# Patient Record
Sex: FEMALE | Race: WHITE
[De-identification: names, ages, dates, MRNs, and addresses within clinical notes are randomized per-mention and may not be internally consistent; named-entity substitution may affect disease eponyms.]

---

## 2018-03-07 ENCOUNTER — HOSPITAL ENCOUNTER (OUTPATIENT)
Dept: HOSPITAL 96 - M.RAD | Age: 68
End: 2018-03-07
Attending: INTERNAL MEDICINE
Payer: MEDICARE

## 2018-03-07 DIAGNOSIS — Z12.31: Primary | ICD-10-CM

## 2020-10-05 ENCOUNTER — HOSPITAL ENCOUNTER (INPATIENT)
Dept: HOSPITAL 96 - M.ERS | Age: 70
LOS: 6 days | Discharge: HOME HEALTH | DRG: 871 | End: 2020-10-11
Attending: INTERNAL MEDICINE | Admitting: INTERNAL MEDICINE
Payer: MEDICARE

## 2020-10-05 VITALS — SYSTOLIC BLOOD PRESSURE: 109 MMHG | DIASTOLIC BLOOD PRESSURE: 43 MMHG

## 2020-10-05 VITALS — DIASTOLIC BLOOD PRESSURE: 39 MMHG | SYSTOLIC BLOOD PRESSURE: 75 MMHG

## 2020-10-05 VITALS — DIASTOLIC BLOOD PRESSURE: 64 MMHG | SYSTOLIC BLOOD PRESSURE: 135 MMHG

## 2020-10-05 VITALS — DIASTOLIC BLOOD PRESSURE: 58 MMHG | SYSTOLIC BLOOD PRESSURE: 131 MMHG

## 2020-10-05 VITALS — SYSTOLIC BLOOD PRESSURE: 97 MMHG | DIASTOLIC BLOOD PRESSURE: 39 MMHG

## 2020-10-05 VITALS — DIASTOLIC BLOOD PRESSURE: 33 MMHG | SYSTOLIC BLOOD PRESSURE: 73 MMHG

## 2020-10-05 VITALS — DIASTOLIC BLOOD PRESSURE: 52 MMHG | SYSTOLIC BLOOD PRESSURE: 97 MMHG

## 2020-10-05 VITALS — SYSTOLIC BLOOD PRESSURE: 97 MMHG | DIASTOLIC BLOOD PRESSURE: 42 MMHG

## 2020-10-05 VITALS — DIASTOLIC BLOOD PRESSURE: 49 MMHG | SYSTOLIC BLOOD PRESSURE: 117 MMHG

## 2020-10-05 VITALS — DIASTOLIC BLOOD PRESSURE: 46 MMHG | SYSTOLIC BLOOD PRESSURE: 113 MMHG

## 2020-10-05 VITALS — SYSTOLIC BLOOD PRESSURE: 142 MMHG | DIASTOLIC BLOOD PRESSURE: 68 MMHG

## 2020-10-05 VITALS — SYSTOLIC BLOOD PRESSURE: 111 MMHG | DIASTOLIC BLOOD PRESSURE: 50 MMHG

## 2020-10-05 VITALS — SYSTOLIC BLOOD PRESSURE: 107 MMHG | DIASTOLIC BLOOD PRESSURE: 59 MMHG

## 2020-10-05 VITALS — SYSTOLIC BLOOD PRESSURE: 126 MMHG | DIASTOLIC BLOOD PRESSURE: 52 MMHG

## 2020-10-05 VITALS — SYSTOLIC BLOOD PRESSURE: 105 MMHG | DIASTOLIC BLOOD PRESSURE: 45 MMHG

## 2020-10-05 VITALS — DIASTOLIC BLOOD PRESSURE: 58 MMHG | SYSTOLIC BLOOD PRESSURE: 121 MMHG

## 2020-10-05 VITALS — SYSTOLIC BLOOD PRESSURE: 71 MMHG | DIASTOLIC BLOOD PRESSURE: 33 MMHG

## 2020-10-05 VITALS — SYSTOLIC BLOOD PRESSURE: 96 MMHG | DIASTOLIC BLOOD PRESSURE: 54 MMHG

## 2020-10-05 VITALS — DIASTOLIC BLOOD PRESSURE: 45 MMHG | SYSTOLIC BLOOD PRESSURE: 103 MMHG

## 2020-10-05 VITALS — SYSTOLIC BLOOD PRESSURE: 106 MMHG | DIASTOLIC BLOOD PRESSURE: 52 MMHG

## 2020-10-05 VITALS — DIASTOLIC BLOOD PRESSURE: 35 MMHG | SYSTOLIC BLOOD PRESSURE: 82 MMHG

## 2020-10-05 VITALS — DIASTOLIC BLOOD PRESSURE: 44 MMHG | SYSTOLIC BLOOD PRESSURE: 98 MMHG

## 2020-10-05 VITALS — SYSTOLIC BLOOD PRESSURE: 123 MMHG | DIASTOLIC BLOOD PRESSURE: 56 MMHG

## 2020-10-05 VITALS — SYSTOLIC BLOOD PRESSURE: 107 MMHG | DIASTOLIC BLOOD PRESSURE: 54 MMHG

## 2020-10-05 VITALS — SYSTOLIC BLOOD PRESSURE: 127 MMHG | DIASTOLIC BLOOD PRESSURE: 92 MMHG

## 2020-10-05 VITALS — DIASTOLIC BLOOD PRESSURE: 48 MMHG | SYSTOLIC BLOOD PRESSURE: 113 MMHG

## 2020-10-05 VITALS — DIASTOLIC BLOOD PRESSURE: 41 MMHG | SYSTOLIC BLOOD PRESSURE: 102 MMHG

## 2020-10-05 VITALS — SYSTOLIC BLOOD PRESSURE: 140 MMHG | DIASTOLIC BLOOD PRESSURE: 41 MMHG

## 2020-10-05 VITALS — DIASTOLIC BLOOD PRESSURE: 61 MMHG | SYSTOLIC BLOOD PRESSURE: 134 MMHG

## 2020-10-05 VITALS — SYSTOLIC BLOOD PRESSURE: 101 MMHG | DIASTOLIC BLOOD PRESSURE: 50 MMHG

## 2020-10-05 VITALS — DIASTOLIC BLOOD PRESSURE: 50 MMHG | SYSTOLIC BLOOD PRESSURE: 108 MMHG

## 2020-10-05 VITALS — SYSTOLIC BLOOD PRESSURE: 99 MMHG | DIASTOLIC BLOOD PRESSURE: 48 MMHG

## 2020-10-05 VITALS — DIASTOLIC BLOOD PRESSURE: 50 MMHG | SYSTOLIC BLOOD PRESSURE: 111 MMHG

## 2020-10-05 VITALS — DIASTOLIC BLOOD PRESSURE: 55 MMHG | SYSTOLIC BLOOD PRESSURE: 134 MMHG

## 2020-10-05 VITALS — SYSTOLIC BLOOD PRESSURE: 89 MMHG | DIASTOLIC BLOOD PRESSURE: 35 MMHG

## 2020-10-05 VITALS — BODY MASS INDEX: 39.81 KG/M2 | WEIGHT: 172 LBS | HEIGHT: 55 IN

## 2020-10-05 VITALS — DIASTOLIC BLOOD PRESSURE: 56 MMHG | SYSTOLIC BLOOD PRESSURE: 116 MMHG

## 2020-10-05 VITALS — SYSTOLIC BLOOD PRESSURE: 97 MMHG | DIASTOLIC BLOOD PRESSURE: 36 MMHG

## 2020-10-05 VITALS — DIASTOLIC BLOOD PRESSURE: 49 MMHG | SYSTOLIC BLOOD PRESSURE: 120 MMHG

## 2020-10-05 VITALS — SYSTOLIC BLOOD PRESSURE: 138 MMHG | DIASTOLIC BLOOD PRESSURE: 62 MMHG

## 2020-10-05 VITALS — DIASTOLIC BLOOD PRESSURE: 53 MMHG | SYSTOLIC BLOOD PRESSURE: 123 MMHG

## 2020-10-05 VITALS — SYSTOLIC BLOOD PRESSURE: 109 MMHG | DIASTOLIC BLOOD PRESSURE: 47 MMHG

## 2020-10-05 VITALS — DIASTOLIC BLOOD PRESSURE: 36 MMHG | SYSTOLIC BLOOD PRESSURE: 76 MMHG

## 2020-10-05 VITALS — SYSTOLIC BLOOD PRESSURE: 111 MMHG | DIASTOLIC BLOOD PRESSURE: 64 MMHG

## 2020-10-05 VITALS — DIASTOLIC BLOOD PRESSURE: 43 MMHG | SYSTOLIC BLOOD PRESSURE: 100 MMHG

## 2020-10-05 VITALS — SYSTOLIC BLOOD PRESSURE: 102 MMHG | DIASTOLIC BLOOD PRESSURE: 42 MMHG

## 2020-10-05 VITALS — DIASTOLIC BLOOD PRESSURE: 40 MMHG | SYSTOLIC BLOOD PRESSURE: 95 MMHG

## 2020-10-05 VITALS — DIASTOLIC BLOOD PRESSURE: 63 MMHG | SYSTOLIC BLOOD PRESSURE: 132 MMHG

## 2020-10-05 VITALS — SYSTOLIC BLOOD PRESSURE: 108 MMHG | DIASTOLIC BLOOD PRESSURE: 54 MMHG

## 2020-10-05 VITALS — SYSTOLIC BLOOD PRESSURE: 87 MMHG | DIASTOLIC BLOOD PRESSURE: 44 MMHG

## 2020-10-05 VITALS — SYSTOLIC BLOOD PRESSURE: 118 MMHG | DIASTOLIC BLOOD PRESSURE: 48 MMHG

## 2020-10-05 VITALS — SYSTOLIC BLOOD PRESSURE: 95 MMHG | DIASTOLIC BLOOD PRESSURE: 38 MMHG

## 2020-10-05 VITALS — SYSTOLIC BLOOD PRESSURE: 106 MMHG | DIASTOLIC BLOOD PRESSURE: 49 MMHG

## 2020-10-05 VITALS — DIASTOLIC BLOOD PRESSURE: 43 MMHG | SYSTOLIC BLOOD PRESSURE: 98 MMHG

## 2020-10-05 VITALS — DIASTOLIC BLOOD PRESSURE: 29 MMHG | SYSTOLIC BLOOD PRESSURE: 72 MMHG

## 2020-10-05 VITALS — SYSTOLIC BLOOD PRESSURE: 86 MMHG | DIASTOLIC BLOOD PRESSURE: 39 MMHG

## 2020-10-05 VITALS — DIASTOLIC BLOOD PRESSURE: 47 MMHG | SYSTOLIC BLOOD PRESSURE: 117 MMHG

## 2020-10-05 VITALS — DIASTOLIC BLOOD PRESSURE: 49 MMHG | SYSTOLIC BLOOD PRESSURE: 123 MMHG

## 2020-10-05 VITALS — DIASTOLIC BLOOD PRESSURE: 39 MMHG | SYSTOLIC BLOOD PRESSURE: 92 MMHG

## 2020-10-05 VITALS — SYSTOLIC BLOOD PRESSURE: 116 MMHG | DIASTOLIC BLOOD PRESSURE: 54 MMHG

## 2020-10-05 VITALS — DIASTOLIC BLOOD PRESSURE: 62 MMHG | SYSTOLIC BLOOD PRESSURE: 112 MMHG

## 2020-10-05 VITALS — SYSTOLIC BLOOD PRESSURE: 104 MMHG | DIASTOLIC BLOOD PRESSURE: 47 MMHG

## 2020-10-05 VITALS — SYSTOLIC BLOOD PRESSURE: 96 MMHG | DIASTOLIC BLOOD PRESSURE: 57 MMHG

## 2020-10-05 VITALS — SYSTOLIC BLOOD PRESSURE: 86 MMHG | DIASTOLIC BLOOD PRESSURE: 36 MMHG

## 2020-10-05 VITALS — SYSTOLIC BLOOD PRESSURE: 125 MMHG | DIASTOLIC BLOOD PRESSURE: 67 MMHG

## 2020-10-05 VITALS — DIASTOLIC BLOOD PRESSURE: 54 MMHG | SYSTOLIC BLOOD PRESSURE: 107 MMHG

## 2020-10-05 DIAGNOSIS — I95.9: ICD-10-CM

## 2020-10-05 DIAGNOSIS — D69.6: ICD-10-CM

## 2020-10-05 DIAGNOSIS — Z28.21: ICD-10-CM

## 2020-10-05 DIAGNOSIS — E44.0: ICD-10-CM

## 2020-10-05 DIAGNOSIS — Z79.82: ICD-10-CM

## 2020-10-05 DIAGNOSIS — Z96.641: ICD-10-CM

## 2020-10-05 DIAGNOSIS — K62.5: ICD-10-CM

## 2020-10-05 DIAGNOSIS — F41.1: ICD-10-CM

## 2020-10-05 DIAGNOSIS — K25.4: ICD-10-CM

## 2020-10-05 DIAGNOSIS — E66.9: ICD-10-CM

## 2020-10-05 DIAGNOSIS — Z90.710: ICD-10-CM

## 2020-10-05 DIAGNOSIS — F32.9: ICD-10-CM

## 2020-10-05 DIAGNOSIS — F44.4: ICD-10-CM

## 2020-10-05 DIAGNOSIS — E87.5: ICD-10-CM

## 2020-10-05 DIAGNOSIS — E87.1: ICD-10-CM

## 2020-10-05 DIAGNOSIS — D50.9: ICD-10-CM

## 2020-10-05 DIAGNOSIS — Z79.4: ICD-10-CM

## 2020-10-05 DIAGNOSIS — K31.89: ICD-10-CM

## 2020-10-05 DIAGNOSIS — N18.9: ICD-10-CM

## 2020-10-05 DIAGNOSIS — K64.8: ICD-10-CM

## 2020-10-05 DIAGNOSIS — E11.40: ICD-10-CM

## 2020-10-05 DIAGNOSIS — Z20.828: ICD-10-CM

## 2020-10-05 DIAGNOSIS — K64.4: ICD-10-CM

## 2020-10-05 DIAGNOSIS — K21.00: ICD-10-CM

## 2020-10-05 DIAGNOSIS — I12.9: ICD-10-CM

## 2020-10-05 DIAGNOSIS — A41.9: Primary | ICD-10-CM

## 2020-10-05 DIAGNOSIS — N17.0: ICD-10-CM

## 2020-10-05 DIAGNOSIS — R68.81: ICD-10-CM

## 2020-10-05 DIAGNOSIS — I25.10: ICD-10-CM

## 2020-10-05 DIAGNOSIS — K22.2: ICD-10-CM

## 2020-10-05 DIAGNOSIS — R65.21: ICD-10-CM

## 2020-10-05 DIAGNOSIS — Z95.1: ICD-10-CM

## 2020-10-05 DIAGNOSIS — E11.22: ICD-10-CM

## 2020-10-05 DIAGNOSIS — G89.29: ICD-10-CM

## 2020-10-05 DIAGNOSIS — K72.00: ICD-10-CM

## 2020-10-05 DIAGNOSIS — Z79.899: ICD-10-CM

## 2020-10-05 LAB
ABSOLUTE BASOPHILS: 0.1 THOU/UL (ref 0–0.2)
ABSOLUTE EOSINOPHILS: 0.2 THOU/UL (ref 0–0.7)
ABSOLUTE MONOCYTES: 0.8 THOU/UL (ref 0–1.2)
ALBUMIN SERPL-MCNC: 3.6 G/DL (ref 3.4–5)
ALP SERPL-CCNC: 58 U/L (ref 46–116)
ALT SERPL-CCNC: 52 U/L (ref 30–65)
ANION GAP SERPL CALC-SCNC: 10 MMOL/L (ref 7–16)
ANION GAP SERPL CALC-SCNC: 11 MMOL/L (ref 7–16)
APTT BLD: 26.6 SECONDS (ref 25–31.3)
AST SERPL-CCNC: 46 U/L (ref 15–37)
BASOPHILS NFR BLD AUTO: 0.9 %
BE: -6.5 MMOL/L
BILIRUB SERPL-MCNC: 0.3 MG/DL
BILIRUB UR-MCNC: NEGATIVE MG/DL
BUN SERPL-MCNC: 79 MG/DL (ref 7–18)
BUN SERPL-MCNC: 93 MG/DL (ref 7–18)
CALCIUM SERPL-MCNC: 8 MG/DL (ref 8.5–10.1)
CALCIUM SERPL-MCNC: 8.7 MG/DL (ref 8.5–10.1)
CHLORIDE SERPL-SCNC: 103 MMOL/L (ref 98–107)
CHLORIDE SERPL-SCNC: 96 MMOL/L (ref 98–107)
CK-MB MASS: 4.8 NG/ML
CO2 SERPL-SCNC: 23 MMOL/L (ref 21–32)
CO2 SERPL-SCNC: 23 MMOL/L (ref 21–32)
COLOR UR: YELLOW
CREAT SERPL-MCNC: 4.4 MG/DL (ref 0.6–1.3)
CREAT SERPL-MCNC: 5.5 MG/DL (ref 0.6–1.3)
EOSINOPHIL NFR BLD: 3.2 %
GAS PNL BLDV: 64.6 MMHG (ref 35–45)
GLUCOSE SERPL-MCNC: 154 MG/DL (ref 70–99)
GLUCOSE SERPL-MCNC: 155 MG/DL (ref 70–99)
GRANULOCYTES NFR BLD MANUAL: 70.4 %
HCT VFR BLD CALC: 32.6 % (ref 37–47)
HGB BLD-MCNC: 10.8 GM/DL (ref 12–15)
INR PPP: 1.2
KETONES UR STRIP-MCNC: NEGATIVE MG/DL
LYMPHOCYTES # BLD: 1 THOU/UL (ref 0.8–5.3)
LYMPHOCYTES NFR BLD AUTO: 13.8 %
MAGNESIUM SERPL-MCNC: 2.7 MG/DL (ref 1.8–2.4)
MCH RBC QN AUTO: 31.7 PG (ref 26–34)
MCHC RBC AUTO-ENTMCNC: 33.1 G/DL (ref 28–37)
MCV RBC: 95.6 FL (ref 80–100)
MONOCYTES NFR BLD: 11.7 %
MPV: 9.6 FL. (ref 7.2–11.1)
NEUTROPHILS # BLD: 4.9 THOU/UL (ref 1.6–8.1)
NT-PRO BRAIN NAT PEPTIDE: 555 PG/ML (ref ?–300)
NUCLEATED RBCS: 0 /100WBC
PCO2 BLDV: 44.6 MMHG (ref 41–51)
PHOSPHATE SERPL-MCNC: 5.9 MG/DL (ref 2.5–4.9)
PLATELET COUNT*: 157 THOU/UL (ref 150–400)
POTASSIUM SERPL-SCNC: 4.8 MMOL/L (ref 3.5–5.1)
POTASSIUM SERPL-SCNC: 5.8 MMOL/L (ref 3.5–5.1)
PROT SERPL-MCNC: 6.9 G/DL (ref 6.4–8.2)
PROT UR QL STRIP: NEGATIVE
PROTHROMBIN TIME: 12.1 SECONDS (ref 9.2–11.5)
RBC # BLD AUTO: 3.41 MIL/UL (ref 4.2–5)
RBC # UR STRIP: NEGATIVE /UL
RDW-CV: 16.5 % (ref 10.5–14.5)
SODIUM SERPL-SCNC: 130 MMOL/L (ref 136–145)
SODIUM SERPL-SCNC: 136 MMOL/L (ref 136–145)
SP GR UR STRIP: 1.01 (ref 1–1.03)
URINE CLARITY: CLEAR
URINE GLUCOSE-RANDOM: NEGATIVE
URINE LEUKOCYTES-REFLEX: NEGATIVE
URINE NITRITE-REFLEX: NEGATIVE
UROBILINOGEN UR STRIP-ACNC: 0.2 E.U./DL (ref 0.2–1)
WBC # BLD AUTO: 6.9 THOU/UL (ref 4–11)

## 2020-10-05 NOTE — CON
[facility] 
[address] [place]  [address]                    CONSULTATION                  
_______________________________________________________________________________
 
Name:       ALPERS,BERNICE KAY             Room:           [address]    ADM IN  
M.R.#:  [id number]      Account #:      [id number]  
Admission:  10/05/20     Attend Phys:    JOSÉ MIGUEL Pastrana
Discharge:               Date of Birth:  [birth year]  
         Report #: [id number]
                                                                     [id number]  
_______________________________________________________________________________
THIS REPORT FOR:  //name//                      
 
cc:  Pernell Lemos MD, Richard T. MD                                                  ~
THIS REPORT FOR:  //name//                      
 
CC: Pernell Trammell
 
DATE OF SERVICE:  10/05/2020
 
 
HISTORY OF PRESENT ILLNESS:  The patient is a 69-year-old single white female
who I was asked to see in the hospital today because of her history of coronary
artery disease.  The patient apparently had coronary artery bypass surgery at
[facility] years ago.  She has been in assisted living for the
past 6 years.  She has a history of chronic kidney disease and follows with a
nephrologist at .  She is not very active and uses a walker.  She was recently
having problems with her blood sugars.  She was admitted to [facility] last week for 5 days.  She was discharged.  She was back to
rehab for 5 days.  However, she was brought to the Emergency Room here at [facility] last night.  She complained of fatigue.  She had to lower herself to the
floor.  She did not pass out.  She was brought here by ambulance.  She denied
any fever, cough or vomiting.  She did have some loose stools, no bleeding. 
Denied chest pain, shortness of breath or palpitations.
 
PAST MEDICAL HISTORY:  She has had hip surgery, bilateral knee surgery.  She had
gastric bypass for obesity.  She has a long history of diabetes.
 
MEDICATIONS:  At rehab center includes aspirin, Janumet, Remeron, omeprazole,
Pristiq, Seroquel, Ranexa, propranolol, tramadol, insulin, simvastatin,
clonazepam, gemfibrozil.
 
ALLERGIES:  She has no known drug allergies.
 
FAMILY HISTORY:  Her mother and father had heart disease.
 
SOCIAL HISTORY:  She is .  No smoking or alcohol abuse.
 
REVIEW OF SYSTEMS:  She has no history of stroke, asthma or liver disease.  She
has chronic kidney disease.  No cancer.  She has a history of bipolar disorder. 
She is followed by Psychiatry.
 
PHYSICAL EXAMINATION:
GENERAL:  Reveals an elderly female lying in bed.  She appeared in no distress.
VITAL SIGNS:  She had a blood pressure of 110/60, pulse 60.  She was afebrile.
 
 
 
[place] [address]                    CONSULTATION                  
_______________________________________________________________________________
 
Name:       ALPERS,BERNICE KAY             Room:           [address]#:  [id number]      Account #:      [id number]  
Admission:  10/05/20     Attend Phys:    JOSÉ MIGUEL Pastrana
Discharge:               Date of Birth:  [birth year]  
         Report #: [id number]
                                                                     [id number]  
_______________________________________________________________________________
HEENT:  She was anicteric.  Conjunctivae are pink.  Mucous members appear dry.
NECK:  Veins do not appear distended.  No carotid bruits.
CHEST:  Clear to auscultation.
CARDIOVASCULAR:  Regular rate and rhythm.
ABDOMEN:  Obese.
EXTREMITIES:  Had no pitting edema.  Dorsalis pedis pulse 2+ bilaterally.
SKIN:  Cool and dry.
NEUROLOGIC:  Nonfocal.
 
DIAGNOSTIC DATA:  Her ECG last night showed a sinus rhythm with a left bundle
branch block.  The patient had an echocardiogram done here at [place] in 2018
that showed an ejection fraction of 60%, aortic sclerosis, mild mitral
regurgitation.  Her nuclear stress test in 2018 showed no evidence of ischemia
with an ejection fraction of 64%.  Her workup in the Emergency Room last night;
she had a portable chest x-ray that showed normal heart size, clear lung fields.
 
LABORATORY DATA:  Her lab work last night; sodium 136, BUN 79, creatinine 4.4. 
Her liver function studies were normal.  Troponin 0.06.  .  Her white
blood cell count 6.9, hematocrit 32.6.
 
IMPRESSION AND RECOMMENDATIONS:
1.  Coronary artery disease.  Previous bypass surgery.  I would continue an
aspirin a day.
2.  Diabetes.
3.  Chronic kidney disease.
4.  Bipolar disorder.
5.  Hypotension.
 
The patient does not appear dehydrated at this time.  I would recommend cutting
back on her psychoactive drugs.  I would hold Lasix at this time.  I would also
hold lisinopril, which the patient is taking.
 
 
 
 
 
 
 
 
 
 
 
 
 
<ELECTRONICALLY SIGNED>
                                        By:  Billy Connelly MD, FACC      
10/05/20     1553
D: 10/05/20 1435_______________________________________
T: 10/05/20 1519Billy Connelly MD, FACC         /nt

## 2020-10-05 NOTE — 2DMMODE
[place] [address]
Phone:  [phone] 2 D/M-MODE ECHOCARDIOGRAM     
_______________________________________________________________________________
 
Name:         ALPERS,BERNICE KAY            Room:          [address]    ADM IN 
M.R.#:    [id number]     Account #:     [id number]  
Admission:    10/05/20    Attend Phys:   González Trammell
Discharge:                Date of Birth: [birth year]  
Date of Service: 10/05/20 1519  Report #:      [id number]
        [id number]
_______________________________________________________________________________
THIS REPORT FOR:
 
cc:  Pernell Lemos MD, Richard T. MD Blick, David R. MD [facility]        
                                                                       ~
 
--------------- APPROVED REPORT --------------
 
 
Study performed:  10/05/2020 13:08:54
 
EXAM: Comprehensive 2D, Doppler, and color-flow 
Echocardiogram 
Patient Location: Bedside   
 
      BSA:         1.67
HR: 66 bpm BP:          106/49 mmHg 
 
Other Information 
Study Quality: Adequate
 
Indications
Abnormal ECG 
Hypotension
 
2D Dimensions
IVSd:  9.13 (7-11mm) LVOT Diam:  18.11 (18-24mm) 
LVDd:  35.42 mm  
PWd:  8.36 (7-11mm) Ascending Ao:  26.94 (22-36mm)
LVDs:  22.44 (25-40mm) 
Aortic Root:  23.28 mm 
 
Volumes
Left Atrial Volume (Systole) 
    LA ESV Index:  23.40 mL/m2
 
Aortic Valve
AoV Peak Archie.:  1.21 m/s 
AO Peak Gr.:  5.87 mmHg  LVOT Max PG:  3.54 mmHg
AO Mean Gr.:  2.97 mmHg  LVOT Mean P.89 mmHg
    LVOT Max V:  0.94 m/s
AO V2 VTI:  19.16 cm  LVOT Mean V:  0.64 m/s
AIDE (VTI):  2.13 cm2  LVOT V1 VTI:  15.85 cm
 
Mitral Valve
 
 
[place] [address]
Phone:  [phone]                     2 D/M-MODE ECHOCARDIOGRAM     
_______________________________________________________________________________
 
Name:         ALPERS,BERNICE KAY            Room:          [address] [place] 
M.R.#:    [id number]     Account #:     [id number]  
Admission:    10/05/20    Attend Phys:   González Trammell
Discharge:                Date of Birth: [birth year]  
Date of Service: 10/05/20 1519  Report #:      [id number]
        [id number]
_______________________________________________________________________________
    E/A Ratio:  0.88
    MV Decel. Time:  188.55 ms
MV E Max Archie.:  0.66 m/s 
MV PHT:  54.68 ms  
MVA (PHT):  4.02 cm2  
 
TDI
E/Lateral E':  7.33 E/Medial E':  11.00
   Medial E' Archie.:  0.06 m/s
   Lateral E' Archie.:  0.09 m/s
 
Pulmonary Valve
PV Peak Archie.:  1.38 m/s PV Peak Gr.:  7.63 mmHg
 
Tricuspid Valve
    RAP Estimate:  20.00 mmHg
TR Peak Gr.:  19.19 mmHg RVSP:  39.19 mmHg
    PA Pressure:  39.19 mmHg
 
Left Ventricle
The left ventricle is normal size. There is normal left ventricular 
wall thickness. The left ventricular systolic function is normal. The 
left ventricular ejection fraction is within the normal range. LVEF 
is 50-55%. Grade I - abnormal relaxation pattern.
 
Right Ventricle
The right ventricle is normal size. The right ventricular systolic 
function is normal.
 
Atria
The left atrium size is normal. The right atrium size is 
normal.
 
Aortic Valve
The aortic valve is normal in structure. No aortic regurgitation is 
present. There is no aortic valvular stenosis.
 
Mitral Valve
The mitral valve is normal in structure. Mild mitral regurgitation. 
No evidence of mitral valve stenosis.
 
Tricuspid Valve
The tricuspid valve is normal in structure. Trace tricuspid 
regurgitation.
 
Pulmonic Valve
 
 
[place] [address]
Phone:  [phone]                     2 D/M-MODE ECHOCARDIOGRAM     
_______________________________________________________________________________
 
Name:         ALPERS,BERNICE KAY            Room:          [address]#:    [id number]     Account #:     [id number]  
Admission:    10/05/20    Attend Phys:   González Trammell
Discharge:                Date of Birth: [birth year]  
Date of Service: 10/05/20 1519  Report #:      [id number]
        [id number]
_______________________________________________________________________________
Pulmonic valve is not well visualized. There is no pulmonic valvular 
regurgitation.
 
Great Vessels
The aortic root is normal in size. IVC is dilated.
 
Pericardium
There is no pericardial effusion.
 
<Conclusion>
The left ventricular systolic function is normal.
The left ventricular ejection fraction is within the normal 
range.
Mild mitral regurgitation.
 
 
 
 
 
 
 
 
 
 
 
 
 
 
 
 
 
 
 
 
 
 
 
 
 
 
 
 
 
 
  <ELECTRONICALLY SIGNED>
                                           By: Billy Connelly MD, FACC      
  10/05/20     1519
D: 10/05/20 1519   _____________________________________
T: 10/05/20 1519   Billy Connelly MD, FACC        /INF

## 2020-10-05 NOTE — PROC
[address]  [address]                    PROCEDURE REPORT              
_______________________________________________________________________________
 
Name:       ALPERS,BERNICE KAY             Room:           [address] [place]  
M.R.#:  [id number]      Account #:      [id number]  
Admission:  10/05/20     Attend Phys:    JOSÉ MIGUEL Pastrana
Discharge:  10/11/20     Date of Birth:  [birth year]  
         Report #: [id number]
                                                                                
_______________________________________________________________________________
THIS REPORT FOR:  //name//                      
 
cc:  Pernell Lemos MD, Richard T. MD                                                  ~
THIS REPORT FOR:  //name//                      
 
For GI report, please see the Provation report in Perceptive 7 content.
 
 
 
 
 
 
 
 
 
 
 
 
 
 
 
 
 
 
 
 
 
 
 
 
 
 
 
 
 
 
 
 
 
 
 
 
 
                       
                                        By:                                
                 
D: 10/10/20     _______________________________________
T: 10/14/20 [facility]4Medical Records Staff GRACIELA       /AL

## 2020-10-05 NOTE — CON
[address]  [address]                    CONSULTATION                  
_______________________________________________________________________________
 
Name:       ALPERS,BERNICE KAY             Room:           [address]    ADM IN  
M.R.#:  [id number]      Account #:      [id number]  
Admission:  10/05/20     Attend Phys:    JOSÉ MIGUEL Pastrana
Discharge:               Date of Birth:  [birth year]  
         Report #: [id number]
                                                                     [id number]  
_______________________________________________________________________________
THIS REPORT FOR:  //name//                      
 
cc:  Pernell Lemos MD, Richard T. MD                                                  ~
THIS REPORT FOR:  //name//                      
 
CC: Pernell Trammell
 
DICTATED BY: Ingrid Zaidi [facility]
 
DATE OF SERVICE:  10/09/2020
 
 
PCP:  Dr. Pernell Lemos.
 
Please note at the time of this dictation, the patient was seen and physically
examined by myself.
 
REASON FOR CONSULTATION:  Anemia.
 
HISTORY OF PRESENT ILLNESS:  This is a 69-year-old female who resides at
[facility] in which she came in because of generalized weakness.
 She normally walks with a walker, but felt too weak to walk.  Apparently, she
had been at [facility] last week for 5 days for UTI.  The patient states she has
been noticing that she has been having black stools for a little while, she does
take NSAIDs and between her pain pills to help with her generalized discomfort
that she has.  She does not do it always on a regular daily basis, but does do
it frequently, she states.  She has had some issues with nausea and vomiting. 
She denies any bright red blood or coffee ground emesis and she does get full
very quickly, she states.  The patient states she had an EGD and colonoscopy
done sometime in the past.  She does not recall where.  She has not seen us
previously.  It was noted that her hemoglobin back in 2016 was 13.1.  On
admission, hemoglobin was 10.8, she has dropped down to 7.9.  This morning, she
is 8.5.
 
ALLERGIES:  Seasonal allergies.
 
MEDICATIONS:  From home include Ceftin, doxepin, furosemide, insulin,
lisinopril, Zaroxolyn, Lopressor, Singulair, K-Dur, Pristiq, Ranexa, Annie
aspirin, iron sulfate, Remeron, Seroquel, Nitrostat, Compazine, Anusol
suppositories, nystatin, Topamax, vitamin B12, Colace, Mag-Ox, Zyloprim,
NovoLog, clonazepam, Lipitor, vitamin D, TriCor, Lorcet, loperamide, and
naproxen.
 
 
 
 
[place] [address]                    CONSULTATION                  
_______________________________________________________________________________
 
Name:       ALPERS,BERNICE KAY             Room:           [address] [place]  
[facility]#:  [id number]      Account #:      [id number]  
Admission:  10/05/20     Attend Phys:    JOSÉ MIGUEL Pastrana
Discharge:               Date of Birth:  [birth year]  
         Report #: [id number]
                                                                     [id number]  
_______________________________________________________________________________
PAST MEDICAL HISTORY:  Diabetes, coronary artery disease, arthritis.
 
PAST SURGICAL HISTORY:  Left arm surgery, bilateral knee surgery, gastric
bypass, hysterectomy, right hip replacement, left elbow replacement and a CABG.
 
FAMILY HISTORY:  Noncontributory.
 
SOCIAL HISTORY:  Denies any alcohol, tobacco or illegal drug use.
 
REVIEW OF SYSTEMS:  Twelve-point review of systems is essentially negative
except what is mentioned in the HPI.
 
PHYSICAL EXAMINATION:
VITAL SIGNS:  Temperature 37.2, pulse 73, respirations 18, blood pressure
143/53.
HEART:  Regular rate and rhythm.
LUNGS:  Diminished, but clear.
ABDOMEN:  Soft, positive bowel sounds in all 4 quadrants with no masses or
tenderness noted.
 
LABORATORY DATA:  Hemoglobin this morning 8.5, white count is 3.9, platelets are
125; BUN is 33, on admission, she was 93.  GFR is 32.  Iron was 31,  percentage
sat 14.  Ferritin 268.  LFTs are completely normal.  CT of the abdomen and
pelvis on admission showed surgical changes in the stomach.  No free air. 
Otherwise, essentially normal.
 
IMPRESSION:
1.  Acute anemia.
2.  Melanotic stool.
3.  Nausea and vomiting.
4.  Early satiety.
5.  Thrombocytopenia.
6.  Nonsteroidal anti-inflammatory drug use.
 
PLAN:
1.  EGD:  Tomorrow with Dr. Mclean.
2.  Further recommendations to be made after the procedure has been performed.
 
Thank you for allowing us to participate in this patient's care.  Please do not
hesitate to call with any questions in regard to this consult.
 
 
 
 
                       
                                        By:                                
                 
D: 10/09/20 0914_______________________________________
T: 10/09/20 0944Sloan Mclean MD               /nt

## 2020-10-05 NOTE — EKG
[place] [address]
Phone:  [phone]                     ELECTROCARDIOGRAM REPORT      
_______________________________________________________________________________
 
Name:         ALPERS,BERNICE KAY            Room:          [address]    ADM [place] 
M.R.#:    [id number]     Account #:     [id number]  
Admission:    10/05/20    Attend Phys:   González Trammell
Discharge:                Date of Birth: [birth year]  
Date of Service: 10/05/20 0023  Report #:      [id number]
        [id number]
_______________________________________________________________________________
THIS REPORT FOR:  //name//                      
 
                         [facility] ED
                                       
Test Date:    2020-10-05               Test Time:    00:23:10
Pat Name:     BERNICE ALPERS           Department:   
Patient ID:   [id number]            Room:         [facility]
Gender:       F                        Technician:   FL
:          1950               Requested By: Pipe Thomas
Order Number: [id number] MD:   Billy Connelly
                                 Measurements
Intervals                              Axis          
Rate:         61                       P:            45
SC:           248                      QRS:          -45
QRSD:         205                      T:            116
QT:           553                                    
QTc:          557                                    
                           Interpretive Statements
Sinus rhythm
Prolonged SC interval
Probable left atrial enlargement
Left bundle branch block
Compared to ECG 2016 16:30:24
First degree AV block now present
Left bundle-branch block now present
Electronically Signed On 10-5-2020 9:40:25 CDT by Billy Connelly
[email or web address]
 
 
 
 
 
 
 
 
 
 
 
 
 
 
 
 
 
  <ELECTRONICALLY SIGNED>
                                           By: Billy Connelly MD, FACC      
  10/05/20     0940
D: 10/05/20 0023   _____________________________________
T: 10/05/20 0023   Billy Connelly MD, FACC        /EPI

## 2020-10-06 VITALS — SYSTOLIC BLOOD PRESSURE: 90 MMHG | DIASTOLIC BLOOD PRESSURE: 47 MMHG

## 2020-10-06 VITALS — SYSTOLIC BLOOD PRESSURE: 113 MMHG | DIASTOLIC BLOOD PRESSURE: 93 MMHG

## 2020-10-06 VITALS — SYSTOLIC BLOOD PRESSURE: 81 MMHG | DIASTOLIC BLOOD PRESSURE: 33 MMHG

## 2020-10-06 VITALS — SYSTOLIC BLOOD PRESSURE: 107 MMHG | DIASTOLIC BLOOD PRESSURE: 40 MMHG

## 2020-10-06 VITALS — SYSTOLIC BLOOD PRESSURE: 141 MMHG | DIASTOLIC BLOOD PRESSURE: 58 MMHG

## 2020-10-06 VITALS — SYSTOLIC BLOOD PRESSURE: 133 MMHG | DIASTOLIC BLOOD PRESSURE: 61 MMHG

## 2020-10-06 VITALS — SYSTOLIC BLOOD PRESSURE: 127 MMHG | DIASTOLIC BLOOD PRESSURE: 52 MMHG

## 2020-10-06 VITALS — DIASTOLIC BLOOD PRESSURE: 41 MMHG | SYSTOLIC BLOOD PRESSURE: 111 MMHG

## 2020-10-06 VITALS — SYSTOLIC BLOOD PRESSURE: 125 MMHG | DIASTOLIC BLOOD PRESSURE: 54 MMHG

## 2020-10-06 VITALS — SYSTOLIC BLOOD PRESSURE: 106 MMHG | DIASTOLIC BLOOD PRESSURE: 53 MMHG

## 2020-10-06 VITALS — SYSTOLIC BLOOD PRESSURE: 102 MMHG | DIASTOLIC BLOOD PRESSURE: 52 MMHG

## 2020-10-06 VITALS — DIASTOLIC BLOOD PRESSURE: 36 MMHG | SYSTOLIC BLOOD PRESSURE: 100 MMHG

## 2020-10-06 VITALS — SYSTOLIC BLOOD PRESSURE: 114 MMHG | DIASTOLIC BLOOD PRESSURE: 53 MMHG

## 2020-10-06 VITALS — SYSTOLIC BLOOD PRESSURE: 113 MMHG | DIASTOLIC BLOOD PRESSURE: 58 MMHG

## 2020-10-06 VITALS — DIASTOLIC BLOOD PRESSURE: 85 MMHG | SYSTOLIC BLOOD PRESSURE: 109 MMHG

## 2020-10-06 VITALS — DIASTOLIC BLOOD PRESSURE: 53 MMHG | SYSTOLIC BLOOD PRESSURE: 118 MMHG

## 2020-10-06 VITALS — DIASTOLIC BLOOD PRESSURE: 32 MMHG | SYSTOLIC BLOOD PRESSURE: 95 MMHG

## 2020-10-06 VITALS — SYSTOLIC BLOOD PRESSURE: 98 MMHG | DIASTOLIC BLOOD PRESSURE: 51 MMHG

## 2020-10-06 VITALS — DIASTOLIC BLOOD PRESSURE: 35 MMHG | SYSTOLIC BLOOD PRESSURE: 94 MMHG

## 2020-10-06 VITALS — DIASTOLIC BLOOD PRESSURE: 72 MMHG | SYSTOLIC BLOOD PRESSURE: 145 MMHG

## 2020-10-06 VITALS — DIASTOLIC BLOOD PRESSURE: 45 MMHG | SYSTOLIC BLOOD PRESSURE: 111 MMHG

## 2020-10-06 VITALS — SYSTOLIC BLOOD PRESSURE: 155 MMHG | DIASTOLIC BLOOD PRESSURE: 68 MMHG

## 2020-10-06 VITALS — SYSTOLIC BLOOD PRESSURE: 120 MMHG | DIASTOLIC BLOOD PRESSURE: 61 MMHG

## 2020-10-06 VITALS — DIASTOLIC BLOOD PRESSURE: 49 MMHG | SYSTOLIC BLOOD PRESSURE: 115 MMHG

## 2020-10-06 VITALS — SYSTOLIC BLOOD PRESSURE: 80 MMHG | DIASTOLIC BLOOD PRESSURE: 31 MMHG

## 2020-10-06 VITALS — SYSTOLIC BLOOD PRESSURE: 136 MMHG | DIASTOLIC BLOOD PRESSURE: 47 MMHG

## 2020-10-06 VITALS — SYSTOLIC BLOOD PRESSURE: 117 MMHG | DIASTOLIC BLOOD PRESSURE: 48 MMHG

## 2020-10-06 VITALS — DIASTOLIC BLOOD PRESSURE: 57 MMHG | SYSTOLIC BLOOD PRESSURE: 104 MMHG

## 2020-10-06 VITALS — SYSTOLIC BLOOD PRESSURE: 88 MMHG | DIASTOLIC BLOOD PRESSURE: 26 MMHG

## 2020-10-06 VITALS — DIASTOLIC BLOOD PRESSURE: 45 MMHG | SYSTOLIC BLOOD PRESSURE: 86 MMHG

## 2020-10-06 VITALS — DIASTOLIC BLOOD PRESSURE: 49 MMHG | SYSTOLIC BLOOD PRESSURE: 109 MMHG

## 2020-10-06 VITALS — SYSTOLIC BLOOD PRESSURE: 102 MMHG | DIASTOLIC BLOOD PRESSURE: 51 MMHG

## 2020-10-06 VITALS — DIASTOLIC BLOOD PRESSURE: 43 MMHG | SYSTOLIC BLOOD PRESSURE: 105 MMHG

## 2020-10-06 VITALS — SYSTOLIC BLOOD PRESSURE: 106 MMHG | DIASTOLIC BLOOD PRESSURE: 48 MMHG

## 2020-10-06 LAB
ALBUMIN SERPL-MCNC: 2.9 G/DL (ref 3.4–5)
ALP SERPL-CCNC: 39 U/L (ref 46–116)
ALT SERPL-CCNC: 36 U/L (ref 30–65)
ANION GAP SERPL CALC-SCNC: 10 MMOL/L (ref 7–16)
AST SERPL-CCNC: 29 U/L (ref 15–37)
BILIRUB SERPL-MCNC: 0.3 MG/DL
BUN SERPL-MCNC: 59 MG/DL (ref 7–18)
CALCIUM SERPL-MCNC: 8.3 MG/DL (ref 8.5–10.1)
CHLORIDE SERPL-SCNC: 107 MMOL/L (ref 98–107)
CO2 SERPL-SCNC: 22 MMOL/L (ref 21–32)
CREAT SERPL-MCNC: 2.9 MG/DL (ref 0.6–1.3)
GLUCOSE SERPL-MCNC: 121 MG/DL (ref 70–99)
HCT VFR BLD CALC: 25.8 % (ref 37–47)
HGB BLD-MCNC: 8.6 GM/DL (ref 12–15)
MAGNESIUM SERPL-MCNC: 2.5 MG/DL (ref 1.8–2.4)
MCH RBC QN AUTO: 31.7 PG (ref 26–34)
MCHC RBC AUTO-ENTMCNC: 33.1 G/DL (ref 28–37)
MCV RBC: 95.8 FL (ref 80–100)
MPV: 10.1 FL. (ref 7.2–11.1)
PLATELET COUNT*: 115 THOU/UL (ref 150–400)
POTASSIUM SERPL-SCNC: 4 MMOL/L (ref 3.5–5.1)
PROT SERPL-MCNC: 5.8 G/DL (ref 6.4–8.2)
RBC # BLD AUTO: 2.7 MIL/UL (ref 4.2–5)
RDW-CV: 16.5 % (ref 10.5–14.5)
SODIUM SERPL-SCNC: 139 MMOL/L (ref 136–145)
WBC # BLD AUTO: 3.8 THOU/UL (ref 4–11)

## 2020-10-07 VITALS — DIASTOLIC BLOOD PRESSURE: 50 MMHG | SYSTOLIC BLOOD PRESSURE: 111 MMHG

## 2020-10-07 VITALS — DIASTOLIC BLOOD PRESSURE: 97 MMHG | SYSTOLIC BLOOD PRESSURE: 127 MMHG

## 2020-10-07 VITALS — SYSTOLIC BLOOD PRESSURE: 98 MMHG | DIASTOLIC BLOOD PRESSURE: 56 MMHG

## 2020-10-07 VITALS — DIASTOLIC BLOOD PRESSURE: 65 MMHG | SYSTOLIC BLOOD PRESSURE: 135 MMHG

## 2020-10-07 VITALS — DIASTOLIC BLOOD PRESSURE: 50 MMHG | SYSTOLIC BLOOD PRESSURE: 101 MMHG

## 2020-10-07 VITALS — SYSTOLIC BLOOD PRESSURE: 112 MMHG | DIASTOLIC BLOOD PRESSURE: 57 MMHG

## 2020-10-07 LAB
ABSOLUTE BASOPHILS: 0 THOU/UL (ref 0–0.2)
ABSOLUTE EOSINOPHILS: 0.2 THOU/UL (ref 0–0.7)
ABSOLUTE MONOCYTES: 0.6 THOU/UL (ref 0–1.2)
ANION GAP SERPL CALC-SCNC: 10 MMOL/L (ref 7–16)
BASOPHILS NFR BLD AUTO: 1.1 %
BUN SERPL-MCNC: 43 MG/DL (ref 7–18)
CALCIUM SERPL-MCNC: 8.3 MG/DL (ref 8.5–10.1)
CHLORIDE SERPL-SCNC: 108 MMOL/L (ref 98–107)
CHOLEST SERPL-MCNC: 120 MG/DL (ref ?–200)
CO2 SERPL-SCNC: 23 MMOL/L (ref 21–32)
CREAT SERPL-MCNC: 2.1 MG/DL (ref 0.6–1.3)
EOSINOPHIL NFR BLD: 4.2 %
GLUCOSE SERPL-MCNC: 111 MG/DL (ref 70–99)
GRANULOCYTES NFR BLD MANUAL: 57.2 %
HCT VFR BLD CALC: 23.5 % (ref 37–47)
HDLC SERPL-MCNC: 32 MG/DL (ref 40–?)
HGB BLD-MCNC: 7.8 GM/DL (ref 12–15)
IRON SERPL-MCNC: 31 UG/DL (ref 50–175)
LDLC SERPL-MCNC: 36 MG/DL (ref ?–100)
LYMPHOCYTES # BLD: 0.9 THOU/UL (ref 0.8–5.3)
LYMPHOCYTES NFR BLD AUTO: 22.6 %
MCH RBC QN AUTO: 31.8 PG (ref 26–34)
MCHC RBC AUTO-ENTMCNC: 33.2 G/DL (ref 28–37)
MCV RBC: 95.8 FL (ref 80–100)
MONOCYTES NFR BLD: 14.9 %
MPV: 10.2 FL. (ref 7.2–11.1)
NEUTROPHILS # BLD: 2.2 THOU/UL (ref 1.6–8.1)
NUCLEATED RBCS: 0 /100WBC
PLATELET COUNT*: 111 THOU/UL (ref 150–400)
POTASSIUM SERPL-SCNC: 4 MMOL/L (ref 3.5–5.1)
RBC # BLD AUTO: 2.46 MIL/UL (ref 4.2–5)
RDW-CV: 15.7 % (ref 10.5–14.5)
SAO2 % BLD FROM PO2: 14 % (ref 20–39)
SODIUM SERPL-SCNC: 141 MMOL/L (ref 136–145)
TC:HDL: 3.8 RATIO
TRIGL SERPL-MCNC: 262 MG/DL (ref ?–150)
VLDLC SERPL CALC-MCNC: 52 MG/DL (ref ?–40)
WBC # BLD AUTO: 3.9 THOU/UL (ref 4–11)

## 2020-10-07 NOTE — CON
[address]  [address]                    CONSULTATION                  
_______________________________________________________________________________
 
Name:       ALPERS,BERNICE KAY             Room:           [address] [place]  
M.R.#:  [id number]      Account #:      [id number]  
Admission:  10/05/20     Attend Phys:    JOSÉ MIGUEL Pastrana
Discharge:               Date of Birth:  [birth year]  
         Report #: [id number]
                                                                     [id number]  
_______________________________________________________________________________
THIS REPORT FOR:  //name//                      
 
cc:  Pernell Lemos MD, Richard T. MD                                                  ~
THIS REPORT FOR:  //name//                      
 
CC: Pernell Trammell
 
DATE OF SERVICE:  10/05/2020
 
 
NEPHROLOGY CONSULTATION
 
CONSULTING PHYSICIAN:  Dr. Velasco.
 
REASON FOR NEPHROLOGY CONSULTATION:  Acute kidney injury and hyperkalemia.
 
REASON FOR ADMISSION:  Generalized weakness.
 
HISTORY OF PRESENT ILLNESS:  This is a 69-year-old  female who has past
medical history of recurrent urinary issues apparently maybe kidney disease, she
has a history of diabetes and neuropathy.  She also has history of some
psychotic issues for which she is on medicines and she lives in assisted living.
 She was brought in because she was very weak.  She was recently admitted to [facility] and the patient said that basically her blood sugar
dropped, but it was also mentioned in her report that she had a UTI.  I am not
sure which antibiotic was used to treat her.  When she was brought in yesterday,
her blood pressure was only 72/29 and she was bolused with IV fluids and then
put on maintenance fluids.  She was also found to have a sodium of 130,
potassium of 5.8, BUN of 93 and a creatinine of 5.5.  We do not have a baseline
creatinine in our system.  This morning with IV fluids, her labs are getting
better.  Her potassium is now normal in 4s and her creatinine is better 4.4. 
She also had to be started on Levophed, which she is still currently getting. 
Medina catheter was placed and she is making good urine already, not sure if she
was retaining any urine when Medina catheter was initially placed, 850 mL of
urine output already since she has been here.  She does live in assisted living.
 She does not know about her medications, but she does say that she goes to 
for her kidneys?  At an assisted living in addition to other medications, she is
on lisinopril.  She is on metolazone, which is 2.5 mg every day.  She is on
Lasix 80 mg every day and topiramate and naproxen also as needed.  Her UA here
is unremarkable.  The patient is completely oriented x 3, but she does not have
much insight into her all medical issues.
 
ALLERGIES:  No known allergies except for seasonal allergies.
 
 
 
 
[place] [address]                    CONSULTATION                  
_______________________________________________________________________________
 
Name:       ALPERS,BERNICE KAY             Room:           [address]    [place]M [place]  
M.R.#:  [id number]      Account #:      [id number]  
Admission:  10/05/20     Attend Phys:    JOSÉ MIGUEL Pastrana
Discharge:               Date of Birth:  [birth year]  
         Report #: [id number]
                                                                     [id number]  
_______________________________________________________________________________
REVIEW OF SYSTEMS:  As mentioned in history of present illness, otherwise
10-point review of systems are negative.
 
HOME MEDICATIONS:  Include baby aspirin, ferrous sulfate, Janumet, Remeron,
montelukast, omeprazole, desvenlafaxine, quetiapine, ranolazine, propranolol,
acetaminophen as needed, bisacodyl, cetirizine, magnesium hydroxide as needed
for constipation, nitroglycerin, olopatadine drops, prochlorperazine, tramadol
as needed, hydrocortisone, Nystatin, insulin glargine, simvastatin, topiramate
25 mg at noon, cyanocobalamin, zolpidem, docusate, gemfibrozil, magnesium oxide
400 mg b.i.d., omega 3, allopurinol 300 mg once a day, NovoLog, clonazepam 1  mg
b.i.d. p.r.n. anxiety, atorvastatin, cefuroxime 250 mg b.i.d., cholecalciferol,
doxepin, fenofibrate, furosemide 80 mg a day, Lorcet, insulin detemir,
lisinopril 10 mg a day, loperamide, metolazone 2.5 mg a day, metoprolol,
naproxen 220 mg b.i.d. p.r.n. pain, and montelukast.
 
PAST MEDICAL AND SURGICAL HISTORY:  Includes history of diabetes, neuropathy,
CABG x 7, chronic pain, manic depressive disorder, generalized anxiety disorder,
obesity, left elbow replacement, right hip replacement, hysterectomy, gastric
bypass, bilateral knee surgery, left arm surgery.
 
SOCIAL HISTORY:  She does not smoke, drink alcohol or use illicit drugs.  She
lives in an assisted living facility, she was able to tell me that.  She does
not have much insight into her medications or her health issues, but she is
completely oriented x 3.
 
FAMILY HISTORY:  She does not remember her parents having any kidney problems.
 
PHYSICAL EXAMINATION:
VITAL SIGNS:  Blood pressure is 106/49, temperature 35.7, pulse rate was 66 when
she first came in, it was also 63.  Respiratory rate is 17, pulse ox 99% on room
air.
GENERAL:  She is awake and she is alert and she is oriented x 3.
HEAD AND EYES:  Atraumatic and normocephalic.  Conjunctivae normal.
EARS, NOSE, AND THROAT:  Normal ears and nose.  Mucous membranes seem to be dry 
Her lips seem to be very dry as well.
NECK:  There is no JVD.
CHEST:  Bilaterally clear to auscultation anteriorly.  No crackles or wheezing
heard.
CARDIOVASCULAR:  S1, S2 normal.  No murmurs heard.
ABDOMEN:  Soft, nondistended, nontender.  Bowel sounds are present.  Obese.
EXTREMITIES:  Lower extremities, there is no lower extremity edema.
SKIN:  Quite dry.
NEUROLOGICAL FUNCTION:  Grossly seems to be intact.
PSYCHIATRIC:  Mood wise, she seems to be mildly depressed.
 
LABORATORY DATA:  WBC 6.9, hemoglobin is 10.8, platelet count is 157.  Sodium
 
 
 
[place] [address]                    CONSULTATION                  
_______________________________________________________________________________
 
Name:       ALPERSTY HENRY             Room:           [address] [place]  
[facility]#:  [id number]      Account #:      [id number]  
Admission:  10/05/20     Attend Phys:    JOSÉ MIGUEL Pastrana
Discharge:               Date of Birth:  [birth year]  
         Report #: [id number]
                                                                     [id number]  
_______________________________________________________________________________
now is 136, potassium is 4.8, BUN is 79, creatinine is 4.4, which is better from
admission and calcium is 8.0, phosphorus 5.9, magnesium is 2.7.  Her CPK was
only 304.  Other labs were reviewed.
 
IMAGING:  Chest x-ray, pelvic x-ray were reviewed.
 
ASSESSMENT:
1.  Acute kidney injury, likely in the setting of diarrhea, which the patient
did report volume depletion, severe hypotension, use of lisinopril, NSAIDs,
naproxen, metolazone, Lasix at home.  Her UA was unremarkable and renal imaging
is pending.
2.  Shock, could be hypovolemic or could be septic shock, which could be because
of enteric source because she has been having some diarrhea.  She is getting IV
fluids.  She is on Levophed and antibiotics as per primary team.  Consider doing
blood cultures.
3.  Hyperkalemia.  This is because she was volume depleted because of decreased
distal sodium delivery, use of lisinopril at home as well as NSAIDs.
4.  Hyponatremia, which has improved with hydration.
5.  Obesity.
6.  Diabetes mellitus type 2, on long-term insulin as well as Janumet.  She has
had neuropathy, defer to primary team for management of that.
7.  Hypertension, though she presented with hypotension, she is in shock.
8.  Functional paraplegia.
9.  Major depressive disorder.  She lives in assisted living facility. 
Generalized anxiety disorder.
10.  Chronic pain.
11.  Coronary artery disease.
12.  Gastroesophageal reflux disease.
13.  Recent urinary tract infection seems like she was getting cefuroxime at
home.
14.  She says she goes to  for kidney issues, we need to try to find her
baseline creatinine.
15.  Her ejection fraction in 2018 was 55% to 60% with grade 1 diastolic
dysfunction.
 
PLAN:
1.  Labs already improving with IV fluids, will try to get a baseline creatinine
and any Nephrology records if she has from  or [facility].
2.  Continue normal saline at 100 mL an hour.
3.  Try to keep a MAP around 65-70 and continue to avoid nephrotoxic agents.
4.  Keep all potential nephrotoxic medications for her on hold, lisinopril,
metolazone, Lasix, naproxen.
5.  Check blood cultures if not already checked.
 
 
 
 
[address] [place]  [address]                    CONSULTATION                  
_______________________________________________________________________________
 
Name:       ALPERS,BERNICE KAY             Room:           [address] [place]  
M.R.#:  [id number]      Account #:      [id number]  
Admission:  10/05/20     Attend Phys:    JOSÉ MIGUEL Pastrana
Discharge:               Date of Birth:  [birth year]  
         Report #: [id number]
                                                                     [id number]  
_______________________________________________________________________________
Thank you for this consultation.  We will continue to follow with you. 
Discussed with the patient and the patient's nurse.
 
 
 
 
 
 
 
 
 
 
 
 
 
 
 
 
 
 
 
 
 
 
 
 
 
 
 
 
 
 
 
 
 
 
 
 
 
 
 
 
 
 
<ELECTRONICALLY SIGNED>
                                        By:  Estrella Martinez MD            
10/07/20     0954
D: 10/05/20 0929_______________________________________
T: 10/05/20 1030Estrella Martinez MD               /nt

## 2020-10-08 VITALS — SYSTOLIC BLOOD PRESSURE: 128 MMHG | DIASTOLIC BLOOD PRESSURE: 59 MMHG

## 2020-10-08 VITALS — DIASTOLIC BLOOD PRESSURE: 63 MMHG | SYSTOLIC BLOOD PRESSURE: 132 MMHG

## 2020-10-08 VITALS — SYSTOLIC BLOOD PRESSURE: 129 MMHG | DIASTOLIC BLOOD PRESSURE: 56 MMHG

## 2020-10-08 LAB
ABSOLUTE BASOPHILS: 0 THOU/UL (ref 0–0.2)
ABSOLUTE EOSINOPHILS: 0.2 THOU/UL (ref 0–0.7)
ABSOLUTE MONOCYTES: 0.5 THOU/UL (ref 0–1.2)
ALBUMIN SERPL-MCNC: 2.6 G/DL (ref 3.4–5)
ALP SERPL-CCNC: 46 U/L (ref 46–116)
ALT SERPL-CCNC: 32 U/L (ref 30–65)
ANION GAP SERPL CALC-SCNC: 9 MMOL/L (ref 7–16)
AST SERPL-CCNC: 30 U/L (ref 15–37)
BASOPHILS NFR BLD AUTO: 1.4 %
BILIRUB SERPL-MCNC: 0.3 MG/DL
BUN SERPL-MCNC: 39 MG/DL (ref 7–18)
CALCIUM SERPL-MCNC: 8.6 MG/DL (ref 8.5–10.1)
CHLORIDE SERPL-SCNC: 108 MMOL/L (ref 98–107)
CO2 SERPL-SCNC: 23 MMOL/L (ref 21–32)
CREAT SERPL-MCNC: 1.8 MG/DL (ref 0.6–1.3)
EOSINOPHIL NFR BLD: 4.7 %
GLUCOSE SERPL-MCNC: 116 MG/DL (ref 70–99)
GRANULOCYTES NFR BLD MANUAL: 52.6 %
HCT VFR BLD CALC: 23.6 % (ref 37–47)
HGB BLD-MCNC: 7.9 GM/DL (ref 12–15)
LYMPHOCYTES # BLD: 0.9 THOU/UL (ref 0.8–5.3)
LYMPHOCYTES NFR BLD AUTO: 26.5 %
MCH RBC QN AUTO: 32.1 PG (ref 26–34)
MCHC RBC AUTO-ENTMCNC: 33.6 G/DL (ref 28–37)
MCV RBC: 95.7 FL (ref 80–100)
MONOCYTES NFR BLD: 14.8 %
MPV: 10.1 FL. (ref 7.2–11.1)
NEUTROPHILS # BLD: 1.9 THOU/UL (ref 1.6–8.1)
NUCLEATED RBCS: 0 /100WBC
PLATELET COUNT*: 102 THOU/UL (ref 150–400)
POTASSIUM SERPL-SCNC: 4.2 MMOL/L (ref 3.5–5.1)
PROT SERPL-MCNC: 5.5 G/DL (ref 6.4–8.2)
RBC # BLD AUTO: 2.47 MIL/UL (ref 4.2–5)
RDW-CV: 15.6 % (ref 10.5–14.5)
SODIUM SERPL-SCNC: 140 MMOL/L (ref 136–145)
WBC # BLD AUTO: 3.6 THOU/UL (ref 4–11)

## 2020-10-09 VITALS — SYSTOLIC BLOOD PRESSURE: 114 MMHG | DIASTOLIC BLOOD PRESSURE: 63 MMHG

## 2020-10-09 VITALS — SYSTOLIC BLOOD PRESSURE: 143 MMHG | DIASTOLIC BLOOD PRESSURE: 53 MMHG

## 2020-10-09 VITALS — DIASTOLIC BLOOD PRESSURE: 72 MMHG | SYSTOLIC BLOOD PRESSURE: 133 MMHG

## 2020-10-09 VITALS — SYSTOLIC BLOOD PRESSURE: 146 MMHG | DIASTOLIC BLOOD PRESSURE: 90 MMHG

## 2020-10-09 LAB
ABSOLUTE BASOPHILS: 0 THOU/UL (ref 0–0.2)
ABSOLUTE EOSINOPHILS: 0.2 THOU/UL (ref 0–0.7)
ABSOLUTE MONOCYTES: 0.5 THOU/UL (ref 0–1.2)
ANION GAP SERPL CALC-SCNC: 8 MMOL/L (ref 7–16)
BASOPHILS NFR BLD AUTO: 1.3 %
BUN SERPL-MCNC: 33 MG/DL (ref 7–18)
CALCIUM SERPL-MCNC: 9.3 MG/DL (ref 8.5–10.1)
CHLORIDE SERPL-SCNC: 107 MMOL/L (ref 98–107)
CO2 SERPL-SCNC: 25 MMOL/L (ref 21–32)
CREAT SERPL-MCNC: 1.6 MG/DL (ref 0.6–1.3)
EOSINOPHIL NFR BLD: 4.9 %
GLUCOSE SERPL-MCNC: 118 MG/DL (ref 70–99)
GRANULOCYTES NFR BLD MANUAL: 59.1 %
HCT VFR BLD CALC: 25.9 % (ref 37–47)
HGB BLD-MCNC: 8.5 GM/DL (ref 12–15)
LYMPHOCYTES # BLD: 0.9 THOU/UL (ref 0.8–5.3)
LYMPHOCYTES NFR BLD AUTO: 21.9 %
MCH RBC QN AUTO: 31.3 PG (ref 26–34)
MCHC RBC AUTO-ENTMCNC: 32.9 G/DL (ref 28–37)
MCV RBC: 95.4 FL (ref 80–100)
MONOCYTES NFR BLD: 12.8 %
MPV: 10.2 FL. (ref 7.2–11.1)
NEUTROPHILS # BLD: 2.3 THOU/UL (ref 1.6–8.1)
NUCLEATED RBCS: 0 /100WBC
PLATELET COUNT*: 125 THOU/UL (ref 150–400)
POTASSIUM SERPL-SCNC: 4.4 MMOL/L (ref 3.5–5.1)
RBC # BLD AUTO: 2.71 MIL/UL (ref 4.2–5)
RDW-CV: 15.9 % (ref 10.5–14.5)
SODIUM SERPL-SCNC: 140 MMOL/L (ref 136–145)
WBC # BLD AUTO: 3.9 THOU/UL (ref 4–11)

## 2020-10-10 VITALS — SYSTOLIC BLOOD PRESSURE: 117 MMHG | DIASTOLIC BLOOD PRESSURE: 66 MMHG

## 2020-10-10 VITALS — SYSTOLIC BLOOD PRESSURE: 121 MMHG | DIASTOLIC BLOOD PRESSURE: 55 MMHG

## 2020-10-10 VITALS — SYSTOLIC BLOOD PRESSURE: 151 MMHG | DIASTOLIC BLOOD PRESSURE: 92 MMHG

## 2020-10-10 LAB
ABSOLUTE BASOPHILS: 0.1 THOU/UL (ref 0–0.2)
ABSOLUTE EOSINOPHILS: 0.2 THOU/UL (ref 0–0.7)
ABSOLUTE MONOCYTES: 0.7 THOU/UL (ref 0–1.2)
ANION GAP SERPL CALC-SCNC: 9 MMOL/L (ref 7–16)
BASOPHILS NFR BLD AUTO: 1.2 %
BUN SERPL-MCNC: 20 MG/DL (ref 7–18)
CALCIUM SERPL-MCNC: 9.1 MG/DL (ref 8.5–10.1)
CHLORIDE SERPL-SCNC: 103 MMOL/L (ref 98–107)
CO2 SERPL-SCNC: 26 MMOL/L (ref 21–32)
CREAT SERPL-MCNC: 1.3 MG/DL (ref 0.6–1.3)
EOSINOPHIL NFR BLD: 4 %
GLUCOSE SERPL-MCNC: 134 MG/DL (ref 70–99)
GRANULOCYTES NFR BLD MANUAL: 64.3 %
HCT VFR BLD CALC: 29.8 % (ref 37–47)
HGB BLD-MCNC: 9.6 GM/DL (ref 12–15)
LYMPHOCYTES # BLD: 1 THOU/UL (ref 0.8–5.3)
LYMPHOCYTES NFR BLD AUTO: 17.7 %
MCH RBC QN AUTO: 30.7 PG (ref 26–34)
MCHC RBC AUTO-ENTMCNC: 32.2 G/DL (ref 28–37)
MCV RBC: 95.3 FL (ref 80–100)
MONOCYTES NFR BLD: 12.8 %
MPV: 9.3 FL. (ref 7.2–11.1)
NEUTROPHILS # BLD: 3.6 THOU/UL (ref 1.6–8.1)
NUCLEATED RBCS: 0 /100WBC
PLATELET COUNT*: 161 THOU/UL (ref 150–400)
POTASSIUM SERPL-SCNC: 3.8 MMOL/L (ref 3.5–5.1)
RBC # BLD AUTO: 3.13 MIL/UL (ref 4.2–5)
RDW-CV: 15.5 % (ref 10.5–14.5)
SODIUM SERPL-SCNC: 138 MMOL/L (ref 136–145)
WBC # BLD AUTO: 5.6 THOU/UL (ref 4–11)

## 2020-10-10 PROCEDURE — 0DJD8ZZ INSPECTION OF LOWER INTESTINAL TRACT, VIA NATURAL OR ARTIFICIAL OPENING ENDOSCOPIC: ICD-10-PCS | Performed by: INTERNAL MEDICINE

## 2020-10-10 PROCEDURE — 0DB68ZX EXCISION OF STOMACH, VIA NATURAL OR ARTIFICIAL OPENING ENDOSCOPIC, DIAGNOSTIC: ICD-10-PCS | Performed by: INTERNAL MEDICINE

## 2020-10-11 VITALS — SYSTOLIC BLOOD PRESSURE: 102 MMHG | DIASTOLIC BLOOD PRESSURE: 56 MMHG

## 2020-10-11 VITALS — DIASTOLIC BLOOD PRESSURE: 56 MMHG | SYSTOLIC BLOOD PRESSURE: 102 MMHG

## 2020-10-11 LAB
ABSOLUTE BASOPHILS: 0.1 THOU/UL (ref 0–0.2)
ABSOLUTE EOSINOPHILS: 0.2 THOU/UL (ref 0–0.7)
ABSOLUTE MONOCYTES: 0.5 THOU/UL (ref 0–1.2)
ANION GAP SERPL CALC-SCNC: 6 MMOL/L (ref 7–16)
BASOPHILS NFR BLD AUTO: 1.2 %
BUN SERPL-MCNC: 17 MG/DL (ref 7–18)
CALCIUM SERPL-MCNC: 8.9 MG/DL (ref 8.5–10.1)
CHLORIDE SERPL-SCNC: 107 MMOL/L (ref 98–107)
CO2 SERPL-SCNC: 26 MMOL/L (ref 21–32)
CREAT SERPL-MCNC: 1.3 MG/DL (ref 0.6–1.3)
EOSINOPHIL NFR BLD: 3.8 %
GLUCOSE SERPL-MCNC: 116 MG/DL (ref 70–99)
GRANULOCYTES NFR BLD MANUAL: 63.5 %
HCT VFR BLD CALC: 25.3 % (ref 37–47)
HGB BLD-MCNC: 8.3 GM/DL (ref 12–15)
LYMPHOCYTES # BLD: 0.9 THOU/UL (ref 0.8–5.3)
LYMPHOCYTES NFR BLD AUTO: 20.2 %
MCH RBC QN AUTO: 31.1 PG (ref 26–34)
MCHC RBC AUTO-ENTMCNC: 32.6 G/DL (ref 28–37)
MCV RBC: 95.5 FL (ref 80–100)
MONOCYTES NFR BLD: 11.3 %
MPV: 9.4 FL. (ref 7.2–11.1)
NEUTROPHILS # BLD: 3 THOU/UL (ref 1.6–8.1)
NUCLEATED RBCS: 0 /100WBC
PLATELET COUNT*: 147 THOU/UL (ref 150–400)
POTASSIUM SERPL-SCNC: 3.9 MMOL/L (ref 3.5–5.1)
RBC # BLD AUTO: 2.65 MIL/UL (ref 4.2–5)
RDW-CV: 16 % (ref 10.5–14.5)
SODIUM SERPL-SCNC: 139 MMOL/L (ref 136–145)
WBC # BLD AUTO: 4.7 THOU/UL (ref 4–11)

## 2020-12-14 ENCOUNTER — HOSPITAL ENCOUNTER (OUTPATIENT)
Dept: HOSPITAL 96 - M.SUR | Age: 70
Discharge: HOME | End: 2020-12-14
Attending: INTERNAL MEDICINE
Payer: MEDICARE

## 2020-12-14 DIAGNOSIS — N18.4: ICD-10-CM

## 2020-12-14 DIAGNOSIS — M10.9: ICD-10-CM

## 2020-12-14 DIAGNOSIS — K29.50: Primary | ICD-10-CM

## 2020-12-14 DIAGNOSIS — Z90.710: ICD-10-CM

## 2020-12-14 DIAGNOSIS — I13.0: ICD-10-CM

## 2020-12-14 DIAGNOSIS — F41.9: ICD-10-CM

## 2020-12-14 DIAGNOSIS — F32.9: ICD-10-CM

## 2020-12-14 DIAGNOSIS — Z96.653: ICD-10-CM

## 2020-12-14 DIAGNOSIS — Z96.641: ICD-10-CM

## 2020-12-14 DIAGNOSIS — I25.10: ICD-10-CM

## 2020-12-14 DIAGNOSIS — K44.9: ICD-10-CM

## 2020-12-14 DIAGNOSIS — Z95.1: ICD-10-CM

## 2020-12-14 DIAGNOSIS — Z98.890: ICD-10-CM

## 2020-12-14 DIAGNOSIS — I50.9: ICD-10-CM

## 2020-12-14 DIAGNOSIS — Z79.899: ICD-10-CM

## 2020-12-14 NOTE — OP
[address]  [address]                    OPERATIVE REPORT              
_______________________________________________________________________________
 
Name:       ALPERS,BERNICE KAY             Room:                      [facility].R.#:  [id number]      Account #:      [id number]  
Admission:  12/14/20     Attend Phys:    Sloan Mclena MD   
Discharge:               Date of Birth:  [birth year]  
         Report #: [id number]
                                                                                
_______________________________________________________________________________
THIS REPORT FOR:  
 
cc:  Edmond Vidal MD, Dennis R MD                                                    ~
     [facility],Medical Records Staff    
 
For GI report, please see the Provation report in Perceptive 7 content.
 
 
 
 
 
 
 
 
 
 
 
 
 
 
 
 
 
 
 
 
 
 
 
 
 
 
 
 
 
 
 
 
 
 
 
 
 
                       
                                        By:                                
                 
D: 12/14/20     _______________________________________
T: 12/15/20 1213Medical Records Staff [facility]       /AL

## 2020-12-17 NOTE — PATH
[address]  [address]                    PATHOLOGY RPT PROCEDURE       
_______________________________________________________________________________
 
Name:       ALPERS,BERNICE KAY             Room:                      [facility] 
M.R.#:  [id number]      Account #:      [id number]  
Admission:  12/14/20     Date of Birth:  [birth year]  
Discharge:                             Report #:    [id number]
                                                         Path Case #: [id number]
_______________________________________________________________________________
 
[facility] Accession Number: [id number]
.                                                                01
Material submitted:                                        .
stomach - GASTRIC BIOPSY
.                                                                01
Clinical history:                                          .
DYSPEPSIA
.                                                                02
**********************************************************************
Diagnosis:
Gastric biopsy:
- Moderate chronic active gastritis compatible with reactive gastropathy
(chemical gastritis), with abundance of eosinophils suggesting allergic
component, negative for Helicobacter pylori organisms, granulomas and
dysplasia.
(LUCINDA:rosalba; 12/16/2020)
.
Special stain:  H. pylori immuno
MBR  12/16/2020  1042 Local
**********************************************************************
.                                                                02
Electronically signed:                                     .
Kenny Cowan MD, Pathologist
NPI- [id number]
.                                                                01
Gross description:                                         .
The specimen is received in formalin, labeled "Bernice Alpers, gastric
biopsy".  Received are three segments of pale tan soft tissue ranging in
size from 0.1 to 0.5 cm in maximum dimensions.  The specimen is submitted
entirely in cassette A1.
(CAA; 12/15/2020)
QAC/QA  12/15/2020  1306 Local
.                                                                02
Pathologist provided ICD-10:
K29.50
.                                                                02
CPT                                                        .
458106, V36954
Specimen Comment: A courtesy copy of this report has been sent to [phone]
[phone]
Specimen Comment: [phone]
Specimen Comment: Report sent to  / DR RIVERA
Specimen Comment: A duplicate report has been generated due to demographic
updates.
***Performed at:  01
   [address]  [id number]
   MD Erik Sumner MD Phone:  [phone]
 
[address]  [address]                    PATHOLOGY RPT PROCEDURE       
_______________________________________________________________________________
 
Name:       ALPERS,BERNICE KAY             Room:                      [facility]#:  [id number]      Account #:      [id number]  
Admission:  12/14/20     Date of Birth:  [birth year]  
Discharge:                             Report #:    [id number]
                                                         Path Case #: [id number]
_______________________________________________________________________________
***Performed at:  02
   [address]  [id number]
   MD Kenny Cowan MD Phone:  [phone]

## 2021-11-05 ENCOUNTER — HOSPITAL ENCOUNTER (INPATIENT)
Dept: HOSPITAL 96 - M.ERS | Age: 71
LOS: 6 days | Discharge: HOME HEALTH | DRG: 246 | End: 2021-11-11
Attending: INTERNAL MEDICINE | Admitting: INTERNAL MEDICINE
Payer: COMMERCIAL

## 2021-11-05 VITALS — BODY MASS INDEX: 41.42 KG/M2 | HEIGHT: 55 IN | WEIGHT: 179 LBS

## 2021-11-05 VITALS — DIASTOLIC BLOOD PRESSURE: 77 MMHG | SYSTOLIC BLOOD PRESSURE: 124 MMHG

## 2021-11-05 VITALS — DIASTOLIC BLOOD PRESSURE: 70 MMHG | SYSTOLIC BLOOD PRESSURE: 116 MMHG

## 2021-11-05 VITALS — SYSTOLIC BLOOD PRESSURE: 107 MMHG | DIASTOLIC BLOOD PRESSURE: 63 MMHG

## 2021-11-05 VITALS — SYSTOLIC BLOOD PRESSURE: 121 MMHG | DIASTOLIC BLOOD PRESSURE: 57 MMHG

## 2021-11-05 VITALS — SYSTOLIC BLOOD PRESSURE: 113 MMHG | DIASTOLIC BLOOD PRESSURE: 59 MMHG

## 2021-11-05 DIAGNOSIS — Z20.822: ICD-10-CM

## 2021-11-05 DIAGNOSIS — D64.9: ICD-10-CM

## 2021-11-05 DIAGNOSIS — T82.858A: Primary | ICD-10-CM

## 2021-11-05 DIAGNOSIS — E66.01: ICD-10-CM

## 2021-11-05 DIAGNOSIS — Z96.653: ICD-10-CM

## 2021-11-05 DIAGNOSIS — I13.0: ICD-10-CM

## 2021-11-05 DIAGNOSIS — Z95.1: ICD-10-CM

## 2021-11-05 DIAGNOSIS — Z90.710: ICD-10-CM

## 2021-11-05 DIAGNOSIS — Z91.09: ICD-10-CM
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
## 2021-11-05 DIAGNOSIS — I25.5: ICD-10-CM

## 2021-11-05 DIAGNOSIS — I25.10: ICD-10-CM

## 2021-11-05 DIAGNOSIS — F31.9: ICD-10-CM

## 2021-11-05 DIAGNOSIS — N18.4: ICD-10-CM
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
## 2021-11-05 DIAGNOSIS — I50.43: ICD-10-CM

## 2021-11-05 DIAGNOSIS — K27.9: ICD-10-CM

## 2021-11-05 DIAGNOSIS — F41.9: ICD-10-CM

## 2021-11-05 DIAGNOSIS — G89.29: ICD-10-CM

## 2021-11-05 DIAGNOSIS — Z98.84: ICD-10-CM

## 2021-11-05 DIAGNOSIS — N17.9: ICD-10-CM

## 2021-11-05 DIAGNOSIS — Z79.899: ICD-10-CM

## 2021-11-05 DIAGNOSIS — E78.5: ICD-10-CM

## 2021-11-05 DIAGNOSIS — Z79.4: ICD-10-CM

## 2021-11-05 DIAGNOSIS — Z96.641: ICD-10-CM

## 2021-11-05 DIAGNOSIS — Z79.84: ICD-10-CM

## 2021-11-05 DIAGNOSIS — E78.00: ICD-10-CM

## 2021-11-05 DIAGNOSIS — I21.4: ICD-10-CM

## 2021-11-05 DIAGNOSIS — F41.1: ICD-10-CM

## 2021-11-05 DIAGNOSIS — Z96.622: ICD-10-CM

## 2021-11-05 DIAGNOSIS — E11.22: ICD-10-CM

## 2021-11-05 LAB
ABSOLUTE BASOPHILS: 0 THOU/UL (ref 0–0.2)
ABSOLUTE EOSINOPHILS: 0.1 THOU/UL (ref 0–0.7)
ABSOLUTE MONOCYTES: 0.4 THOU/UL (ref 0–1.2)
ALBUMIN SERPL-MCNC: 3.6 G/DL (ref 3.4–5)
ALP SERPL-CCNC: 56 U/L (ref 46–116)
ALT SERPL-CCNC: 27 U/L (ref 30–65)
ANION GAP SERPL CALC-SCNC: 13 MMOL/L (ref 7–16)
AST SERPL-CCNC: 28 U/L (ref 15–37)
BASOPHILS NFR BLD AUTO: 0.6 %
BILIRUB SERPL-MCNC: 0.3 MG/DL
BUN SERPL-MCNC: 47 MG/DL (ref 7–18)
CALCIUM SERPL-MCNC: 9.3 MG/DL (ref 8.5–10.1)
CHLORIDE SERPL-SCNC: 103 MMOL/L (ref 98–107)
CO2 SERPL-SCNC: 24 MMOL/L (ref 21–32)
CREAT SERPL-MCNC: 2.8 MG/DL (ref 0.6–1.3)
EOSINOPHIL NFR BLD: 2.5 %
GLUCOSE SERPL-MCNC: 173 MG/DL (ref 70–99)
GRANULOCYTES NFR BLD MANUAL: 71.6 %
HCT VFR BLD CALC: 30.6 % (ref 37–47)
HGB BLD-MCNC: 9.8 GM/DL (ref 12–15)
LIPASE: 128 U/L (ref 73–393)
LYMPHOCYTES # BLD: 0.7 THOU/UL (ref 0.8–5.3)
LYMPHOCYTES NFR BLD AUTO: 16.1 %
MAGNESIUM SERPL-MCNC: 2.1 MG/DL (ref 1.8–2.4)
MCH RBC QN AUTO: 29.6 PG (ref 26–34)
MCHC RBC AUTO-ENTMCNC: 31.9 G/DL (ref 28–37)
MCV RBC: 92.7 FL (ref 80–100)
MONOCYTES NFR BLD: 9.2 %
MPV: 9.8 FL. (ref 7.2–11.1)
NEUTROPHILS # BLD: 3.1 THOU/UL (ref 1.6–8.1)
NT-PRO BRAIN NAT PEPTIDE: 2518 PG/ML (ref ?–300)
NUCLEATED RBCS: 0 /100WBC
PLATELET COUNT*: 155 THOU/UL (ref 150–400)
POTASSIUM SERPL-SCNC: 3.8 MMOL/L (ref 3.5–5.1)
PROT SERPL-MCNC: 7.1 G/DL (ref 6.4–8.2)
RBC # BLD AUTO: 3.3 MIL/UL (ref 4.2–5)
RDW-CV: 16.5 % (ref 10.5–14.5)
SODIUM SERPL-SCNC: 140 MMOL/L (ref 136–145)
WBC # BLD AUTO: 4.3 THOU/UL (ref 4–11)

## 2021-11-05 NOTE — EKG
[place] [address]
Phone:  [phone]                     ELECTROCARDIOGRAM REPORT      
_______________________________________________________________________________
 
Name:         ALPERS,BERNICE KAY            Room:          [place] [address]    ADM [place] 
.R.#:    [id number]     Account #:     [id number]  
Admission:    21    Attend Phys:   Gaston Macdonald, 
Discharge:                Date of Birth: [birth year]  
Date of Service: 21 0939  Report #:      [id number]
        [id number]
_______________________________________________________________________________
THIS REPORT FOR:  //name//                      
 
                         [facility] ED
                                       
Test Date:    2021               Test Time:    09:39:45
Pat Name:     BERNICE ALPERS           Department:   
Patient ID:   [id number]            Room:         [facility]
Gender:       F                        Technician:   student
:          1950               Requested By: Ke Mcgregor
Order Number: [id number] MD:   Edward Woods
                                 Measurements
Intervals                              Axis          
Rate:         94                       P:            115
UT:           166                      QRS:          -61
QRSD:         180                      T:            123
QT:           428                                    
QTc:          536                                    
                           Interpretive Statements
Sinus rhythm
Left bundle branch block
Baseline wander in lead(s) V3
Compared to ECG 10/05/2020 00:23:10
First degree AV block no longer present
Electronically Signed On 2021 17:28:13 CDT by Edward Woods
[email or web address]
 
 
 
 
 
 
 
 
 
 
 
 
 
 
 
 
 
 
 
  <ELECTRONICALLY SIGNED>
                                           By: Edward Woods MD, FACC   
  21     1728
D: 21 0939   _____________________________________
T: 21 0939   Edward Woods MD, FACC     /EPI

## 2021-11-05 NOTE — 2DMMODE
[place] [address]
Phone:  [phone] 2 D/M-MODE ECHOCARDIOGRAM     
_______________________________________________________________________________
 
Name:         ALPERS,BERNICE KAY            Room:          [facility]    ADM [place] 
[facility]#:    [id number]     Account #:     [id number]  
Admission:    21    Attend Phys:   Gaston Macdonald, 
Discharge:                Date of Birth: [birth year]  
Date of Service: 21 1719  Report #:      [id number]
        [id number]
_______________________________________________________________________________
THIS REPORT FOR:
 
cc:  Edmond Vidal MD, Dennis R MD [place],Edward DEAL MD M[facility]     
                                                                       ~
 
--------------- APPROVED REPORT --------------
 
 
Study performed:  2021 15:32:00
 
EXAM: Comprehensive 2D, Doppler, and color-flow 
Echocardiogram 
Patient Location: In-Patient   
Room #:  227     Status:  routine
 
      BSA:         1.68
HR: 87 bpm BP:          116/70 mmHg 
Rhythm: NSR     
 
Other Information 
Study Quality: Good
 
Indications
Dyspnea 
 
2D Dimensions
IVSd:  12.90 (7-11mm) LVOT Diam:  20.60 (18-24mm) 
LVDd:  52.15 mm  
PWd:  8.85 (7-11mm) Ascending Ao:  33.09 (22-36mm)
LVDs:  38.07 (25-40mm) 
Aortic Root:  32.99 mm 
 
Volumes
Left Atrial Volume (Systole) 
    LA ESV Index:  32.40 mL/m2
 
Aortic Valve
AoV Peak Archie.:  1.17 m/s 
AO Peak Gr.:  5.45 mmHg  LVOT Max PG:  3.34 mmHg
AO Mean Gr.:  2.71 mmHg  LVOT Mean P.47 mmHg
    LVOT Max V:  0.91 m/s
AO V2 VTI:  18.90 cm  LVOT Mean V:  0.54 m/s
AIDE (VTI):  2.81 cm2  LVOT V1 VTI:  15.94 cm
 
 
 
[place] [address]
Phone:  [phone]                     2 D/M-MODE ECHOCARDIOGRAM     
_______________________________________________________________________________
 
Name:         ALPERS,BERNICE KAY            Room:          [address] [place] 
[facility]#:    [id number]     Account #:     [id number]  
Admission:    21    Attend Phys:   Gaston Macdonald, 
Discharge:                Date of Birth: [birth year]  
Date of Service: 21 1719  Report #:      [id number]
        [id number]
_______________________________________________________________________________
Mitral Valve
    E/A Ratio:  1.07
    MV Decel. Time:  151.97 ms
MV E Max Archie.:  1.27 m/s 
MV PHT:  44.07 ms  
MVA (PHT):  4.99 cm2  
 
TDI
E/Lateral E':  9.77 
   Lateral E' Archie.:  0.13 m/s
 
Pulmonary Valve
PV Peak Archie.:  1.05 m/s PV Peak Gr.:  4.37 mmHg
 
Tricuspid Valve
    RAP Estimate:  5.00 mmHg
TR Peak Gr.:  34.13 mmHg RVSP:  39.00 mmHg
    PA Pressure:  39.00 mmHg
 
Left Ventricle
The left ventricle is normal size. There is a large region of 
hypokinesis involving the mid to distal anteroapical and mid to 
distal septal and apical walls. Mild septal hypertrophy is present. 
Left ventricular systolic function is mild to moderately decreased. 
LVEF is 40-45%. Transmitral Doppler flow pattern suggests impaired LV 
relaxation.
 
Right Ventricle
The right ventricle is normal size. The right ventricular systolic 
function is normal.
 
Atria
The left atrium size is normal. The right atrium size is 
normal.
 
Aortic Valve
Mild aortic valve sclerosis. No aortic regurgitation is present. 
There is no aortic valvular stenosis.
 
Mitral Valve
There is mitral annular calcification. Moderate mitral regurgitation. 
No evidence of mitral valve stenosis.
 
Tricuspid Valve
The tricuspid valve is normal in structure. Mild tricuspid 
regurgitation. Mild pulmonary hypertension.
 
 
[place] [address]
Phone:  [phone]                     2 D/M-MODE ECHOCARDIOGRAM     
_______________________________________________________________________________
 
Name:         ALPERS,BERNICE KAY            Room:          [address] [place] 
[facility]#:    [id number]     Account #:     [id number]  
Admission:    21    Attend Phys:   Gaston Macdonald, 
Discharge:                Date of Birth: [birth year]  
Date of Service: 21 1719  Report #:      [id number]
        [id number]
_______________________________________________________________________________
 
Pulmonic Valve
The pulmonary valve is normal in structure. Mild pulmonic 
regurgitation.
 
Great Vessels
The aortic root is normal in size. IVC is normal in size and 
collapses >50% with inspiration.
 
Pericardium
There is no pericardial effusion.
 
<Conclusion>
The left ventricle is normal size.
Mild septal hypertrophy is present.
Left ventricular systolic function is mild to moderately 
decreased.
LVEF is 40-45%.
Transmitral Doppler flow pattern suggests impaired LV 
relaxation.
There is a large region of hypokinesis involving the mid to distal 
anteroapical and mid to distal septal and apical walls.
Mild aortic valve sclerosis.
There is mitral annular calcification.
Moderate mitral regurgitation.
Mild tricuspid regurgitation.
Mild pulmonary hypertension.
IVC is normal in size and collapses >50% with inspiration.
 
 
 
 
 
 
 
 
 
 
 
 
 
 
 
 
  <ELECTRONICALLY SIGNED>
                                           By: Edward Woods MD, FACC   
  21
D: 21   _____________________________________
T: 21   Edward Woods MD, FACC     /INF

## 2021-11-05 NOTE — EKG
[place] [address]
Phone:  [phone]                     ELECTROCARDIOGRAM REPORT      
_______________________________________________________________________________
 
Name:         ALPERS,BERNICE KAY            Room:          [place] [address]    ADM [place] 
.R.#:    [id number]     Account #:     [id number]  
Admission:    21    Attend Phys:   Gaston Macdonald, 
Discharge:                Date of Birth: [birth year]  
Date of Service: 21 1108  Report #:      [id number]
        [id number]
_______________________________________________________________________________
THIS REPORT FOR:  //name//                      
 
                         [facility] ED
                                       
Test Date:    2021               Test Time:    11:08:40
Pat Name:     BERNICE ALPERS           Department:   
Patient ID:   [id number]            Room:         [facility]
Gender:       F                        Technician:   STUDENT
:          1950               Requested By: Ke Mcgregor
Order Number: [id number] MD:   Edward Woods
                                 Measurements
Intervals                              Axis          
Rate:         90                       P:            77
VT:           216                      QRS:          -55
QRSD:         186                      T:            138
QT:           433                                    
QTc:          530                                    
                           Interpretive Statements
Sinus rhythm
Left bundle branch block
Compared to ECG 2021 09:39:45
No significant changes
Electronically Signed On 2021 17:28:35 CDT by Edward Woods
[email or web address]
 
 
 
 
 
 
 
 
 
 
 
 
 
 
 
 
 
 
 
 
  <ELECTRONICALLY SIGNED>
                                           By: Edward Woods MD, FACC   
  21     1728
D: 21 1108   _____________________________________
T: 21 1108   Edward Woods MD, FAC     /EPI

## 2021-11-06 VITALS — SYSTOLIC BLOOD PRESSURE: 113 MMHG | DIASTOLIC BLOOD PRESSURE: 73 MMHG

## 2021-11-06 VITALS — SYSTOLIC BLOOD PRESSURE: 122 MMHG | DIASTOLIC BLOOD PRESSURE: 66 MMHG

## 2021-11-06 VITALS — DIASTOLIC BLOOD PRESSURE: 69 MMHG | SYSTOLIC BLOOD PRESSURE: 120 MMHG

## 2021-11-06 VITALS — SYSTOLIC BLOOD PRESSURE: 126 MMHG | DIASTOLIC BLOOD PRESSURE: 61 MMHG

## 2021-11-06 VITALS — DIASTOLIC BLOOD PRESSURE: 30 MMHG | SYSTOLIC BLOOD PRESSURE: 80 MMHG

## 2021-11-06 VITALS — DIASTOLIC BLOOD PRESSURE: 60 MMHG | SYSTOLIC BLOOD PRESSURE: 99 MMHG

## 2021-11-06 VITALS — SYSTOLIC BLOOD PRESSURE: 116 MMHG | DIASTOLIC BLOOD PRESSURE: 64 MMHG

## 2021-11-06 VITALS — SYSTOLIC BLOOD PRESSURE: 98 MMHG | DIASTOLIC BLOOD PRESSURE: 64 MMHG

## 2021-11-06 VITALS — SYSTOLIC BLOOD PRESSURE: 116 MMHG | DIASTOLIC BLOOD PRESSURE: 65 MMHG

## 2021-11-06 VITALS — DIASTOLIC BLOOD PRESSURE: 21 MMHG | SYSTOLIC BLOOD PRESSURE: 61 MMHG

## 2021-11-06 VITALS — DIASTOLIC BLOOD PRESSURE: 55 MMHG | SYSTOLIC BLOOD PRESSURE: 117 MMHG

## 2021-11-06 VITALS — DIASTOLIC BLOOD PRESSURE: 33 MMHG | SYSTOLIC BLOOD PRESSURE: 105 MMHG

## 2021-11-06 LAB
ABSOLUTE BASOPHILS: 0 THOU/UL (ref 0–0.2)
ABSOLUTE EOSINOPHILS: 0.1 THOU/UL (ref 0–0.7)
ABSOLUTE MONOCYTES: 0.5 THOU/UL (ref 0–1.2)
ANION GAP SERPL CALC-SCNC: 10 MMOL/L (ref 7–16)
BASOPHILS NFR BLD AUTO: 0.7 %
BUN SERPL-MCNC: 50 MG/DL (ref 7–18)
CALCIUM SERPL-MCNC: 9.5 MG/DL (ref 8.5–10.1)
CHLORIDE SERPL-SCNC: 104 MMOL/L (ref 98–107)
CO2 SERPL-SCNC: 28 MMOL/L (ref 21–32)
CREAT SERPL-MCNC: 3.2 MG/DL (ref 0.6–1.3)
EOSINOPHIL NFR BLD: 2.8 %
GLUCOSE SERPL-MCNC: 143 MG/DL (ref 70–99)
GRANULOCYTES NFR BLD MANUAL: 64.5 %
HCT VFR BLD CALC: 31.6 % (ref 37–47)
HGB BLD-MCNC: 10.3 GM/DL (ref 12–15)
LYMPHOCYTES # BLD: 0.9 THOU/UL (ref 0.8–5.3)
LYMPHOCYTES NFR BLD AUTO: 20.4 %
MCH RBC QN AUTO: 29.6 PG (ref 26–34)
MCHC RBC AUTO-ENTMCNC: 32.5 G/DL (ref 28–37)
MCV RBC: 91.2 FL (ref 80–100)
MONOCYTES NFR BLD: 11.6 %
MPV: 9.4 FL. (ref 7.2–11.1)
NEUTROPHILS # BLD: 3 THOU/UL (ref 1.6–8.1)
NUCLEATED RBCS: 0 /100WBC
PLATELET COUNT*: 153 THOU/UL (ref 150–400)
POTASSIUM SERPL-SCNC: 4.6 MMOL/L (ref 3.5–5.1)
RBC # BLD AUTO: 3.47 MIL/UL (ref 4.2–5)
RDW-CV: 16.3 % (ref 10.5–14.5)
SODIUM SERPL-SCNC: 142 MMOL/L (ref 136–145)
WBC # BLD AUTO: 4.6 THOU/UL (ref 4–11)

## 2021-11-07 VITALS — DIASTOLIC BLOOD PRESSURE: 72 MMHG | SYSTOLIC BLOOD PRESSURE: 135 MMHG

## 2021-11-07 VITALS — DIASTOLIC BLOOD PRESSURE: 42 MMHG | SYSTOLIC BLOOD PRESSURE: 88 MMHG

## 2021-11-07 VITALS — DIASTOLIC BLOOD PRESSURE: 55 MMHG | SYSTOLIC BLOOD PRESSURE: 93 MMHG

## 2021-11-07 VITALS — SYSTOLIC BLOOD PRESSURE: 101 MMHG | DIASTOLIC BLOOD PRESSURE: 55 MMHG

## 2021-11-07 VITALS — DIASTOLIC BLOOD PRESSURE: 52 MMHG | SYSTOLIC BLOOD PRESSURE: 109 MMHG

## 2021-11-07 LAB
ABSOLUTE BASOPHILS: 0 THOU/UL (ref 0–0.2)
ABSOLUTE EOSINOPHILS: 0.1 THOU/UL (ref 0–0.7)
ABSOLUTE MONOCYTES: 0.5 THOU/UL (ref 0–1.2)
ALBUMIN SERPL-MCNC: 3.1 G/DL (ref 3.4–5)
ALP SERPL-CCNC: 58 U/L (ref 46–116)
ALT SERPL-CCNC: 23 U/L (ref 30–65)
ANION GAP SERPL CALC-SCNC: 12 MMOL/L (ref 7–16)
AST SERPL-CCNC: 23 U/L (ref 15–37)
BACTERIA-REFLEX: (no result) /HPF
BASOPHILS NFR BLD AUTO: 0.9 %
BILIRUB SERPL-MCNC: 0.3 MG/DL
BILIRUB UR-MCNC: NEGATIVE MG/DL
BUN SERPL-MCNC: 59 MG/DL (ref 7–18)
CALCIUM SERPL-MCNC: 9.2 MG/DL (ref 8.5–10.1)
CHLORIDE SERPL-SCNC: 102 MMOL/L (ref 98–107)
CO2 SERPL-SCNC: 24 MMOL/L (ref 21–32)
COLOR UR: YELLOW
CREAT SERPL-MCNC: 3.4 MG/DL (ref 0.6–1.3)
EOSINOPHIL NFR BLD: 3.4 %
GLUCOSE SERPL-MCNC: 159 MG/DL (ref 70–99)
GRANULOCYTES NFR BLD MANUAL: 56.3 %
HCT VFR BLD CALC: 28.9 % (ref 37–47)
HGB BLD-MCNC: 9.4 GM/DL (ref 12–15)
KETONES UR STRIP-MCNC: NEGATIVE MG/DL
LYMPHOCYTES # BLD: 1.1 THOU/UL (ref 0.8–5.3)
LYMPHOCYTES NFR BLD AUTO: 27.2 %
MCH RBC QN AUTO: 29.7 PG (ref 26–34)
MCHC RBC AUTO-ENTMCNC: 32.6 G/DL (ref 28–37)
MCV RBC: 91.1 FL (ref 80–100)
MONOCYTES NFR BLD: 12.2 %
MPV: 9.4 FL. (ref 7.2–11.1)
NEUTROPHILS # BLD: 2.3 THOU/UL (ref 1.6–8.1)
NUCLEATED RBCS: 0 /100WBC
PLATELET COUNT*: 135 THOU/UL (ref 150–400)
POTASSIUM SERPL-SCNC: 4.3 MMOL/L (ref 3.5–5.1)
PROT SERPL-MCNC: 6.3 G/DL (ref 6.4–8.2)
PROT UR QL STRIP: NEGATIVE
RBC # BLD AUTO: 3.17 MIL/UL (ref 4.2–5)
RBC # UR STRIP: NEGATIVE /UL
RBC #/AREA URNS HPF: (no result) /HPF (ref 0–2)
RDW-CV: 16.2 % (ref 10.5–14.5)
SODIUM SERPL-SCNC: 138 MMOL/L (ref 136–145)
SP GR UR STRIP: <= 1.005 (ref 1–1.03)
SQUAMOUS: (no result) /LPF (ref 0–3)
URINE CHLORIDE-RANDOM*: 23 MMOL/L
URINE CLARITY: CLEAR
URINE GLUCOSE-RANDOM: NEGATIVE
URINE LEUKOCYTES-REFLEX: (no result)
URINE NITRITE-REFLEX: NEGATIVE
URINE WBC-REFLEX: (no result) /HPF (ref 0–5)
UROBILINOGEN UR STRIP-ACNC: 0.2 E.U./DL (ref 0.2–1)
WBC # BLD AUTO: 4.2 THOU/UL (ref 4–11)

## 2021-11-08 VITALS — DIASTOLIC BLOOD PRESSURE: 62 MMHG | SYSTOLIC BLOOD PRESSURE: 114 MMHG

## 2021-11-08 VITALS — DIASTOLIC BLOOD PRESSURE: 47 MMHG | SYSTOLIC BLOOD PRESSURE: 91 MMHG

## 2021-11-08 VITALS — SYSTOLIC BLOOD PRESSURE: 133 MMHG | DIASTOLIC BLOOD PRESSURE: 77 MMHG

## 2021-11-08 VITALS — SYSTOLIC BLOOD PRESSURE: 128 MMHG | DIASTOLIC BLOOD PRESSURE: 70 MMHG

## 2021-11-08 VITALS — DIASTOLIC BLOOD PRESSURE: 81 MMHG | SYSTOLIC BLOOD PRESSURE: 131 MMHG

## 2021-11-08 VITALS — SYSTOLIC BLOOD PRESSURE: 108 MMHG | DIASTOLIC BLOOD PRESSURE: 53 MMHG

## 2021-11-08 LAB
ABSOLUTE BASOPHILS: 0 THOU/UL (ref 0–0.2)
ABSOLUTE EOSINOPHILS: 0.2 THOU/UL (ref 0–0.7)
ABSOLUTE MONOCYTES: 0.5 THOU/UL (ref 0–1.2)
ALBUMIN SERPL-MCNC: 3 G/DL (ref 3.4–5)
ALP SERPL-CCNC: 56 U/L (ref 46–116)
ALT SERPL-CCNC: 22 U/L (ref 30–65)
ANION GAP SERPL CALC-SCNC: 8 MMOL/L (ref 7–16)
AST SERPL-CCNC: 23 U/L (ref 15–37)
BASOPHILS NFR BLD AUTO: 1 %
BILIRUB SERPL-MCNC: 0.3 MG/DL
BUN SERPL-MCNC: 58 MG/DL (ref 7–18)
CALCIUM SERPL-MCNC: 8.9 MG/DL (ref 8.5–10.1)
CHLORIDE SERPL-SCNC: 100 MMOL/L (ref 98–107)
CO2 SERPL-SCNC: 25 MMOL/L (ref 21–32)
CREAT SERPL-MCNC: 3 MG/DL (ref 0.6–1.3)
EOSINOPHIL NFR BLD: 4.3 %
GLUCOSE SERPL-MCNC: 154 MG/DL (ref 70–99)
GRANULOCYTES NFR BLD MANUAL: 51.2 %
HCT VFR BLD CALC: 27.7 % (ref 37–47)
HGB BLD-MCNC: 9.1 GM/DL (ref 12–15)
LYMPHOCYTES # BLD: 1.4 THOU/UL (ref 0.8–5.3)
LYMPHOCYTES NFR BLD AUTO: 31.4 %
MCH RBC QN AUTO: 29.8 PG (ref 26–34)
MCHC RBC AUTO-ENTMCNC: 33 G/DL (ref 28–37)
MCV RBC: 90.3 FL (ref 80–100)
MONOCYTES NFR BLD: 12.1 %
MPV: 9.4 FL. (ref 7.2–11.1)
NEUTROPHILS # BLD: 2.2 THOU/UL (ref 1.6–8.1)
NUCLEATED RBCS: 0 /100WBC
PLATELET COUNT*: 138 THOU/UL (ref 150–400)
POTASSIUM SERPL-SCNC: 4.1 MMOL/L (ref 3.5–5.1)
PROT SERPL-MCNC: 6.3 G/DL (ref 6.4–8.2)
RBC # BLD AUTO: 3.07 MIL/UL (ref 4.2–5)
RDW-CV: 16 % (ref 10.5–14.5)
SODIUM SERPL-SCNC: 133 MMOL/L (ref 136–145)
WBC # BLD AUTO: 4.3 THOU/UL (ref 4–11)

## 2021-11-09 VITALS — SYSTOLIC BLOOD PRESSURE: 100 MMHG | DIASTOLIC BLOOD PRESSURE: 60 MMHG

## 2021-11-09 VITALS — SYSTOLIC BLOOD PRESSURE: 112 MMHG | DIASTOLIC BLOOD PRESSURE: 64 MMHG

## 2021-11-09 VITALS — SYSTOLIC BLOOD PRESSURE: 113 MMHG | DIASTOLIC BLOOD PRESSURE: 59 MMHG

## 2021-11-09 VITALS — SYSTOLIC BLOOD PRESSURE: 124 MMHG | DIASTOLIC BLOOD PRESSURE: 68 MMHG

## 2021-11-09 VITALS — DIASTOLIC BLOOD PRESSURE: 62 MMHG | SYSTOLIC BLOOD PRESSURE: 122 MMHG

## 2021-11-09 VITALS — SYSTOLIC BLOOD PRESSURE: 108 MMHG | DIASTOLIC BLOOD PRESSURE: 60 MMHG

## 2021-11-09 VITALS — DIASTOLIC BLOOD PRESSURE: 57 MMHG | SYSTOLIC BLOOD PRESSURE: 122 MMHG

## 2021-11-09 VITALS — SYSTOLIC BLOOD PRESSURE: 116 MMHG | DIASTOLIC BLOOD PRESSURE: 57 MMHG

## 2021-11-09 VITALS — SYSTOLIC BLOOD PRESSURE: 127 MMHG | DIASTOLIC BLOOD PRESSURE: 64 MMHG

## 2021-11-09 VITALS — SYSTOLIC BLOOD PRESSURE: 133 MMHG | DIASTOLIC BLOOD PRESSURE: 77 MMHG

## 2021-11-09 VITALS — DIASTOLIC BLOOD PRESSURE: 85 MMHG | SYSTOLIC BLOOD PRESSURE: 124 MMHG

## 2021-11-09 VITALS — DIASTOLIC BLOOD PRESSURE: 64 MMHG | SYSTOLIC BLOOD PRESSURE: 112 MMHG

## 2021-11-09 VITALS — DIASTOLIC BLOOD PRESSURE: 57 MMHG | SYSTOLIC BLOOD PRESSURE: 104 MMHG

## 2021-11-09 LAB
ABSOLUTE BASOPHILS: 0 THOU/UL (ref 0–0.2)
ABSOLUTE EOSINOPHILS: 0.1 THOU/UL (ref 0–0.7)
ABSOLUTE MONOCYTES: 0.5 THOU/UL (ref 0–1.2)
ALBUMIN SERPL-MCNC: 3.1 G/DL (ref 3.4–5)
ALP SERPL-CCNC: 52 U/L (ref 46–116)
ALT SERPL-CCNC: 23 U/L (ref 30–65)
ANION GAP SERPL CALC-SCNC: 11 MMOL/L (ref 7–16)
AST SERPL-CCNC: 24 U/L (ref 15–37)
BASOPHILS NFR BLD AUTO: 1 %
BILIRUB SERPL-MCNC: 0.3 MG/DL
BUN SERPL-MCNC: 49 MG/DL (ref 7–18)
CALCIUM SERPL-MCNC: 9.1 MG/DL (ref 8.5–10.1)
CHLORIDE SERPL-SCNC: 101 MMOL/L (ref 98–107)
CHOLEST SERPL-MCNC: 182 MG/DL (ref ?–200)
CO2 SERPL-SCNC: 23 MMOL/L (ref 21–32)
CREAT SERPL-MCNC: 2.3 MG/DL (ref 0.6–1.3)
EOSINOPHIL NFR BLD: 3.3 %
GLUCOSE SERPL-MCNC: 166 MG/DL (ref 70–99)
GRANULOCYTES NFR BLD MANUAL: 58.1 %
HCT VFR BLD CALC: 27.4 % (ref 37–47)
HDLC SERPL-MCNC: 39 MG/DL (ref 40–?)
HGB BLD-MCNC: 9.1 GM/DL (ref 12–15)
LYMPHOCYTES # BLD: 1.1 THOU/UL (ref 0.8–5.3)
LYMPHOCYTES NFR BLD AUTO: 25.5 %
MCH RBC QN AUTO: 29.8 PG (ref 26–34)
MCHC RBC AUTO-ENTMCNC: 33.1 G/DL (ref 28–37)
MCV RBC: 89.8 FL (ref 80–100)
MONOCYTES NFR BLD: 12.1 %
MPV: 9.8 FL. (ref 7.2–11.1)
NEUTROPHILS # BLD: 2.4 THOU/UL (ref 1.6–8.1)
NUCLEATED RBCS: 0 /100WBC
PLATELET COUNT*: 133 THOU/UL (ref 150–400)
POTASSIUM SERPL-SCNC: 3.9 MMOL/L (ref 3.5–5.1)
PROT SERPL-MCNC: 6.5 G/DL (ref 6.4–8.2)
RBC # BLD AUTO: 3.05 MIL/UL (ref 4.2–5)
RDW-CV: 16 % (ref 10.5–14.5)
SODIUM SERPL-SCNC: 135 MMOL/L (ref 136–145)
TC:HDL: 4.7 RATIO
TRIGL SERPL-MCNC: 436 MG/DL (ref ?–150)
VLDLC SERPL CALC-MCNC: 87 MG/DL (ref ?–40)
WBC # BLD AUTO: 4.2 THOU/UL (ref 4–11)

## 2021-11-09 PROCEDURE — 027034Z DILATION OF CORONARY ARTERY, ONE ARTERY WITH DRUG-ELUTING INTRALUMINAL DEVICE, PERCUTANEOUS APPROACH: ICD-10-PCS | Performed by: INTERNAL MEDICINE

## 2021-11-09 PROCEDURE — B211YZZ FLUOROSCOPY OF MULTIPLE CORONARY ARTERIES USING OTHER CONTRAST: ICD-10-PCS | Performed by: INTERNAL MEDICINE

## 2021-11-09 PROCEDURE — B218YZZ FLUOROSCOPY OF LEFT INTERNAL MAMMARY BYPASS GRAFT USING OTHER CONTRAST: ICD-10-PCS | Performed by: INTERNAL MEDICINE

## 2021-11-09 PROCEDURE — 4A023N7 MEASUREMENT OF CARDIAC SAMPLING AND PRESSURE, LEFT HEART, PERCUTANEOUS APPROACH: ICD-10-PCS | Performed by: INTERNAL MEDICINE

## 2021-11-09 PROCEDURE — B213YZZ FLUOROSCOPY OF MULTIPLE CORONARY ARTERY BYPASS GRAFTS USING OTHER CONTRAST: ICD-10-PCS | Performed by: INTERNAL MEDICINE

## 2021-11-09 NOTE — EKG
[place] [address]
Phone:  [phone]                     ELECTROCARDIOGRAM REPORT      
_______________________________________________________________________________
 
Name:         ALPERS,BERNICE KAY            Room:          [place] [address]    ADM IN 
.R.#:    [id number]     Account #:     [id number]  
Admission:    21    Attend Phys:   Gaston Macdonald, 
Discharge:                Date of Birth: [birth year]  
Date of Service: 21 1256  Report #:      [id number]
        [id number]
_______________________________________________________________________________
THIS REPORT FOR:  //name//                      
 
                          [facility]
                                       
Test Date:    2021               Test Time:    12:56:25
Pat Name:     BERNICE ALPERS           Department:   
Patient ID:   [id number]            Room:         [place] [address]
Gender:       F                        Technician:   EULOGIO
:          1950               Requested By: Billy Connelly
Order Number: [id number]    Reading MD:   Edward Woods
                                 Measurements
Intervals                              Axis          
Rate:         94                       P:            72
MS:           164                      QRS:          -55
QRSD:         174                      T:            151
QT:           430                                    
QTc:          538                                    
                           Interpretive Statements
Sinus rhythm
Left bundle branch block
Baseline wander in lead(s) I,II,aVR
Compared to ECG 2021 11:08:40
No significant changes
Electronically Signed On 2021 15:47:27 CST by Edward Woods
[email or web address]
 
 
 
 
 
 
 
 
 
 
 
 
 
 
 
 
 
 
 
  <ELECTRONICALLY SIGNED>
                                           By: Edward Woods MD, FACC   
  21     1547
D: 21 1256   _____________________________________
T: 21 1256   Edward Woods MD, FACC     /EPI

## 2021-11-10 VITALS — DIASTOLIC BLOOD PRESSURE: 66 MMHG | SYSTOLIC BLOOD PRESSURE: 120 MMHG

## 2021-11-10 VITALS — SYSTOLIC BLOOD PRESSURE: 137 MMHG | DIASTOLIC BLOOD PRESSURE: 64 MMHG

## 2021-11-10 VITALS — DIASTOLIC BLOOD PRESSURE: 58 MMHG | SYSTOLIC BLOOD PRESSURE: 103 MMHG

## 2021-11-10 VITALS — SYSTOLIC BLOOD PRESSURE: 127 MMHG | DIASTOLIC BLOOD PRESSURE: 65 MMHG

## 2021-11-10 VITALS — SYSTOLIC BLOOD PRESSURE: 120 MMHG | DIASTOLIC BLOOD PRESSURE: 65 MMHG

## 2021-11-10 VITALS — DIASTOLIC BLOOD PRESSURE: 59 MMHG | SYSTOLIC BLOOD PRESSURE: 114 MMHG

## 2021-11-10 LAB
ABSOLUTE BASOPHILS: 0 THOU/UL (ref 0–0.2)
ABSOLUTE EOSINOPHILS: 0.1 THOU/UL (ref 0–0.7)
ABSOLUTE MONOCYTES: 0.7 THOU/UL (ref 0–1.2)
ALBUMIN SERPL-MCNC: 3.1 G/DL (ref 3.4–5)
ALP SERPL-CCNC: 47 U/L (ref 46–116)
ALT SERPL-CCNC: 25 U/L (ref 30–65)
ANION GAP SERPL CALC-SCNC: 11 MMOL/L (ref 7–16)
AST SERPL-CCNC: 28 U/L (ref 15–37)
BASOPHILS NFR BLD AUTO: 0.6 %
BILIRUB SERPL-MCNC: 0.3 MG/DL
BUN SERPL-MCNC: 31 MG/DL (ref 7–18)
CALCIUM SERPL-MCNC: 8.9 MG/DL (ref 8.5–10.1)
CHLORIDE SERPL-SCNC: 108 MMOL/L (ref 98–107)
CO2 SERPL-SCNC: 22 MMOL/L (ref 21–32)
CREAT SERPL-MCNC: 1.8 MG/DL (ref 0.6–1.3)
EOSINOPHIL NFR BLD: 2.3 %
GLUCOSE SERPL-MCNC: 148 MG/DL (ref 70–99)
GRANULOCYTES NFR BLD MANUAL: 64.4 %
HCT VFR BLD CALC: 26.5 % (ref 37–47)
HGB BLD-MCNC: 8.8 GM/DL (ref 12–15)
LYMPHOCYTES # BLD: 0.8 THOU/UL (ref 0.8–5.3)
LYMPHOCYTES NFR BLD AUTO: 18.3 %
MCH RBC QN AUTO: 29.9 PG (ref 26–34)
MCHC RBC AUTO-ENTMCNC: 33.1 G/DL (ref 28–37)
MCV RBC: 90.2 FL (ref 80–100)
MONOCYTES NFR BLD: 14.4 %
MPV: 9.3 FL. (ref 7.2–11.1)
NEUTROPHILS # BLD: 3 THOU/UL (ref 1.6–8.1)
NUCLEATED RBCS: 0 /100WBC
PLATELET COUNT*: 132 THOU/UL (ref 150–400)
POTASSIUM SERPL-SCNC: 3.9 MMOL/L (ref 3.5–5.1)
PROT SERPL-MCNC: 5.8 G/DL (ref 6.4–8.2)
RBC # BLD AUTO: 2.94 MIL/UL (ref 4.2–5)
RDW-CV: 16.1 % (ref 10.5–14.5)
SODIUM SERPL-SCNC: 141 MMOL/L (ref 136–145)
WBC # BLD AUTO: 4.6 THOU/UL (ref 4–11)

## 2021-11-10 NOTE — CARD
[address]  [address]                    CARDIAC CATH REPORT           
_______________________________________________________________________________
 
Name:       ALPERS,BERNICE KAY             Room:           [facility]    ADM IN  
.R.#:  [id number]      Account #:      [id number]  
Admission:  11/05/21     Attend Phys:    Gaston Macdonald MD 
Discharge:               Date of Birth:  [birth year]  
         Report #: [id number]
                                                                     [id number]
_______________________________________________________________________________
THIS REPORT FOR:  
 
cc:  Edmond Vidal MD, Dennis R MD Blick, David R. MD [facility]                                            ~
 
 
--------------- APPROVED REPORT --------------
 
 
Study performed:  11/09/2021 11:38:09
 
Patient Details
Patient Status: In-Patient                  Room #: 227
The patient is a 71 year-old female
 
Event Personnel
Sana Castellanos RN Circulator, Edward Woods  Cardiologist, Cassandra Estrada RCIS Monitor, Divya Ugarte RTR ScrubBahman Janel RN RN, 
Jeff Arce RTR Monitor, Billy Connelly  Interventional 
Cardiologist
 
Procedures Performed
Left Heart Cath Coronaries, Bypass Grafts [id number] LHCCORCABG NELDA 
Revasc Graft Single CIRC  SVGREVSING Hemostasis w/ 
Angioseal
 
Indication
Non-STEMI , Cardiomyopathy, Chest pain
 
Risk Factors
Hypercholesterolemia, Coronary Artery Disease
 
Previous Procedures/Diagnoses
Previous CABG
 
Admission/Lab Medications/Medications given during procedure
Heparin Unfract., Lidocaine Subcut 15 ml, Midazolam (Versed) IV 1 mg, 
Fentanyl IV 25 mcg, Heparin IV 7000 units, Heparin IV 1000 
units
 
Procedure Narrative
The patient was brought electively to the Cardiac Catheterization 
Laboratory and was prepped and draped in a sterile manner. The right 
femoral was infiltrated with 2% Lidocaine subcutaneous anesthesia. IV 
conscious sedation was used throughout procedure with appropriate 
 
 
 
[place] [address]                    CARDIAC CATH REPORT           
_______________________________________________________________________________
 
Name:       ALPERS,BERNICE KAY             Room:           [address] [place]  
[facility]#:  [id number]      Account #:      [id number]  
Admission:  11/05/21     Attend Phys:    Gaston Macdonald MD 
Discharge:               Date of Birth:  [birth year]  
         Report #: [id number]
                                                                     [id number]
_______________________________________________________________________________
 
monitoring and was performed in the presence of a registered nurse 
who was an independent trained observer other than the physician 
performing the procedure. A Bladen 6 FR sheath was inserted into 
the right femoral artery. Coronary angiography was performed using 
coronary diagnostic catheters. The right coronary system was accessed 
and visualized with a Diagnostic JR4 6Fr catheter. The left coronary 
system was accessed and visualized with a Diagnostic JL4 6Fr 
catheter. The left ventricle was accessed and visualized with a 
Diagnostic JR4 6Fr catheter. Left ventricular/Aortic Valve gradient 
assessed via catheter pullback. Closure device was deployed with a 6 
Fr Angioseal. The patient tolerated the procedure well and there were 
no complications associated with the procedure. There was no 
hematoma.
 
Intraoperative Conscious Sedation
Sedation start time:  1205           Case end Time:  
1241    
Fentanyl  25 mcg    Versed  1 mg  
 
Fluoro Time:    6.5 minutes     
Dose:     DAP 66008 cGycm2  1116.87 mGy  
Contrast Type and Amount:  Visipaque 95 mL     
 
Coronary Angiography
The patient's coronary anatomy is co- dominant. 
 
Native Artery Percent Stenosis
The LIMA graft to the mid LAD is widely patent with good distal 
anastomoses.
A saphenous vein graft to the right PDA is widely patent with good 
distal and proximal anastomoses.
A saphenous vein graft to the first diagonal second obtuse marginal 
and third obtuse marginal branches has a focal 90% eccentric 
narrowing proximally.
 
Diagnostic Cath
Left Main The left main coronary artery is short and normal and 
bifurcates into a left anterior descending and circumflex coronary 
artery.
LAD  The left anterior descending coronary artery is totally occluded 
in its proximal portion after the takeoff of a septal  
branch.  The mid and distal LAD are filled by a LIMA graft and appear 
to be free of significant disease.
Diagonal 1 A first diagonal branch is filled by a graft and appears 
to be diffusely moderately plaqued.
 
 
 
[place] [address]                    CARDIAC CATH REPORT           
_______________________________________________________________________________
 
Name:       ALPERS,BERNICE KAY             Room:           [address] [place]  
[facility]#:  [id number]      Account #:      [id number]  
Admission:  11/05/21     Attend Phys:    Gaston Macdonald MD 
Discharge:               Date of Birth:  [birth year]  
         Report #: [id number]
                                                                     [id number]
_______________________________________________________________________________
 
Circumflex The circumflex coronary artery is totally occluded in its 
midportion after the takeoff of a small obtuse marginal 
branch.
OM1  First obtuse marginal branch is small and free of significant 
disease.
OM2  A second obtuse marginal branch is filled by a saphenous vein 
graft and appears to be free of significant disease.
OM3  A third obtuse marginal branches filled by a saphenous vein 
graft and has minimal nonocclusive plaquing.
Right Coronary The right coronary artery is totally occluded 
proximally.
R PDA  The PDA is filled by a saphenous vein graft and appears to be 
free of significant disease.
RPLV  A large and branch right posterior lateral LV branch has 
moderate nonocclusive plaquing and fills retrogradely by saphenous 
vein graft.
 
Left Ventriculography
Left Ventriculography was not performed.     
 
Hemodynamics
The aortic pressure is 115/64 mmHg with a mean of 39 mmHg. The left 
ventricular pressure is 124/3 mmHg with a mean of mmHg. The left 
ventricular end diastolic pressure is 11 mmHg. There was no gradient 
across the aortic valve upon pullback. Pullback from the left 
ventricle to the aorta revealed no gradient across the aortic 
valve.
 
PCI Technique Lesion
Anticoagulation was achieved with Heparin. Patient was preloaded with 
Plavix. Percutaneous coronary intervention was performed on the SVG 
to the circumflex artery.. The lesion stenosis prior to intervention 
was 90% with CHERY 3 flow. A 6F LCB 100CM Guide Catheter was used to 
engage the Left SVG ostium. A IG: BMW 190cm Interventional Guidewire 
was used to cross the lesion.
 
STENT DEPLOYMENT
A drug-eluting stent Jamari RX Stent  3.5X18mm was inserted and 
inflated up to 16.00atm for 20seconds. Repeat angiography revealed 
the following post-stent deployment results: 0% stenosis. Additional 
Inflation: 17.00atm for 20seconds.
 
Final angiography reveals 0 % stenosis with CHERY 3 
flow.
 
 
 
 
[address]  [address]                    CARDIAC CATH REPORT           
_______________________________________________________________________________
 
Name:       ALPERSTY HENRY             Room:           [address] [place]  
M.R.#:  [id number]      Account #:      [id number]  
Admission:  11/05/21     Attend Phys:    Gaston Macdonald MD 
Discharge:               Date of Birth:  [birth year]  
         Report #: [id number]
                                                                     [id number]
_______________________________________________________________________________
 
Conclusion
1.  Severe native three-vessel disease as outlined above.
2.  Widely patent LIMA graft to the LAD.
3.  Widely patent saphenous vein graft to the PDA.
4.  Critical stenosis of a saphenous vein graft to the first diagonal,
 second obtuse marginal and third obtuse marginal branch.
5.  successful placement of a drug eluting stent in the vein graft to 
the circumflex artery. 
 
Recommendations
1.  Percutaneous coronary intervention to the saphenous vein graft to 
the first diagonal, second obtuse marginal and third obtuse marginal 
branch.
2.  Continue aggressive risk factor modification.
 
Diagnostic Cath Approved by: Edward Woods MD        Date/Time:
 
 
 
 
 
 
 
 
 
 
 
 
 
 
 
 
 
 
 
 
 
 
 
 
 
 
 
<ELECTRONICALLY SIGNED>
                                        By:  Billy Connelly MD, FACC      
11/10/21     1631
D: 11/10/21 1631_______________________________________
T: 11/10/21 1631Dnilton Connelly MD, FACC         /INF

## 2021-11-11 VITALS — DIASTOLIC BLOOD PRESSURE: 53 MMHG | SYSTOLIC BLOOD PRESSURE: 120 MMHG

## 2021-11-11 VITALS — DIASTOLIC BLOOD PRESSURE: 62 MMHG | SYSTOLIC BLOOD PRESSURE: 137 MMHG

## 2021-11-11 VITALS — SYSTOLIC BLOOD PRESSURE: 124 MMHG | DIASTOLIC BLOOD PRESSURE: 56 MMHG

## 2021-11-11 VITALS — SYSTOLIC BLOOD PRESSURE: 120 MMHG | DIASTOLIC BLOOD PRESSURE: 53 MMHG

## 2021-11-11 LAB
ABSOLUTE BASOPHILS: 0 THOU/UL (ref 0–0.2)
ABSOLUTE EOSINOPHILS: 0.1 THOU/UL (ref 0–0.7)
ABSOLUTE MONOCYTES: 0.5 THOU/UL (ref 0–1.2)
ANION GAP SERPL CALC-SCNC: 10 MMOL/L (ref 7–16)
BASOPHILS NFR BLD AUTO: 1 %
BUN SERPL-MCNC: 29 MG/DL (ref 7–18)
CALCIUM SERPL-MCNC: 9 MG/DL (ref 8.5–10.1)
CHLORIDE SERPL-SCNC: 108 MMOL/L (ref 98–107)
CO2 SERPL-SCNC: 22 MMOL/L (ref 21–32)
CREAT SERPL-MCNC: 1.7 MG/DL (ref 0.6–1.3)
EOSINOPHIL NFR BLD: 3.6 %
GLUCOSE SERPL-MCNC: 152 MG/DL (ref 70–99)
GRANULOCYTES NFR BLD MANUAL: 64.4 %
HCT VFR BLD CALC: 27.9 % (ref 37–47)
HGB BLD-MCNC: 9.1 GM/DL (ref 12–15)
LYMPHOCYTES # BLD: 0.7 THOU/UL (ref 0.8–5.3)
LYMPHOCYTES NFR BLD AUTO: 17.3 %
MCH RBC QN AUTO: 29.8 PG (ref 26–34)
MCHC RBC AUTO-ENTMCNC: 32.6 G/DL (ref 28–37)
MCV RBC: 91.4 FL (ref 80–100)
MONOCYTES NFR BLD: 13.7 %
MPV: 8.7 FL. (ref 7.2–11.1)
NEUTROPHILS # BLD: 2.5 THOU/UL (ref 1.6–8.1)
NUCLEATED RBCS: 0 /100WBC
PLATELET COUNT*: 121 THOU/UL (ref 150–400)
POTASSIUM SERPL-SCNC: 4.1 MMOL/L (ref 3.5–5.1)
RBC # BLD AUTO: 3.06 MIL/UL (ref 4.2–5)
RDW-CV: 16.1 % (ref 10.5–14.5)
SODIUM SERPL-SCNC: 140 MMOL/L (ref 136–145)
WBC # BLD AUTO: 3.9 THOU/UL (ref 4–11)

## 2021-11-24 ENCOUNTER — HOSPITAL ENCOUNTER (OUTPATIENT)
Dept: HOSPITAL 96 - M.LAB | Age: 71
End: 2021-11-24
Attending: NURSE PRACTITIONER
Payer: COMMERCIAL

## 2021-11-24 DIAGNOSIS — I25.810: Primary | ICD-10-CM

## 2021-11-24 DIAGNOSIS — Z86.79: ICD-10-CM

## 2021-11-24 LAB
ABSOLUTE BASOPHILS: 0 THOU/UL (ref 0–0.2)
ABSOLUTE EOSINOPHILS: 0.1 THOU/UL (ref 0–0.7)
ABSOLUTE MONOCYTES: 0.4 THOU/UL (ref 0–1.2)
ALBUMIN SERPL-MCNC: 3.6 G/DL (ref 3.4–5)
ALP SERPL-CCNC: 61 U/L (ref 46–116)
ALT SERPL-CCNC: 23 U/L (ref 30–65)
ANION GAP SERPL CALC-SCNC: 8 MMOL/L (ref 7–16)
AST SERPL-CCNC: 17 U/L (ref 15–37)
BASOPHILS NFR BLD AUTO: 0.5 %
BILIRUB SERPL-MCNC: 0.3 MG/DL
BUN SERPL-MCNC: 54 MG/DL (ref 7–18)
CALCIUM SERPL-MCNC: 9.3 MG/DL (ref 8.5–10.1)
CHLORIDE SERPL-SCNC: 104 MMOL/L (ref 98–107)
CO2 SERPL-SCNC: 26 MMOL/L (ref 21–32)
CREAT SERPL-MCNC: 2.7 MG/DL (ref 0.6–1.3)
EOSINOPHIL NFR BLD: 2.3 %
GLUCOSE SERPL-MCNC: 114 MG/DL (ref 70–99)
GRANULOCYTES NFR BLD MANUAL: 78.5 %
HCT VFR BLD CALC: 30.4 % (ref 37–47)
HGB BLD-MCNC: 9.9 GM/DL (ref 12–15)
LYMPHOCYTES # BLD: 0.4 THOU/UL (ref 0.8–5.3)
LYMPHOCYTES NFR BLD AUTO: 9.5 %
MCH RBC QN AUTO: 30.1 PG (ref 26–34)
MCHC RBC AUTO-ENTMCNC: 32.6 G/DL (ref 28–37)
MCV RBC: 92.3 FL (ref 80–100)
MONOCYTES NFR BLD: 9.2 %
MPV: 9 FL. (ref 7.2–11.1)
NEUTROPHILS # BLD: 3.7 THOU/UL (ref 1.6–8.1)
NUCLEATED RBCS: 0 /100WBC
PLATELET COUNT*: 157 THOU/UL (ref 150–400)
POTASSIUM SERPL-SCNC: 4.5 MMOL/L (ref 3.5–5.1)
PROT SERPL-MCNC: 7.1 G/DL (ref 6.4–8.2)
RBC # BLD AUTO: 3.3 MIL/UL (ref 4.2–5)
RDW-CV: 16.2 % (ref 10.5–14.5)
SODIUM SERPL-SCNC: 138 MMOL/L (ref 136–145)
WBC # BLD AUTO: 4.7 THOU/UL (ref 4–11)

## 2022-02-02 ENCOUNTER — HOSPITAL ENCOUNTER (INPATIENT)
Dept: HOSPITAL 96 - M.ERS | Age: 72
LOS: 7 days | Discharge: HOME HEALTH | DRG: 640 | End: 2022-02-09
Attending: INTERNAL MEDICINE | Admitting: INTERNAL MEDICINE
Payer: COMMERCIAL

## 2022-02-02 VITALS — WEIGHT: 185.41 LBS | BODY MASS INDEX: 42.91 KG/M2 | HEIGHT: 55 IN

## 2022-02-02 VITALS — SYSTOLIC BLOOD PRESSURE: 115 MMHG | DIASTOLIC BLOOD PRESSURE: 50 MMHG

## 2022-02-02 VITALS — DIASTOLIC BLOOD PRESSURE: 74 MMHG | SYSTOLIC BLOOD PRESSURE: 141 MMHG

## 2022-02-02 DIAGNOSIS — E66.01: ICD-10-CM

## 2022-02-02 DIAGNOSIS — D64.9: ICD-10-CM

## 2022-02-02 DIAGNOSIS — Z96.622: ICD-10-CM

## 2022-02-02 DIAGNOSIS — E11.9: ICD-10-CM

## 2022-02-02 DIAGNOSIS — I50.42: ICD-10-CM

## 2022-02-02 DIAGNOSIS — E87.1: Primary | ICD-10-CM
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
## 2022-02-02 DIAGNOSIS — Z98.84: ICD-10-CM

## 2022-02-02 DIAGNOSIS — S00.83XA: ICD-10-CM

## 2022-02-02 DIAGNOSIS — G93.41: ICD-10-CM

## 2022-02-02 DIAGNOSIS — Z96.651: ICD-10-CM

## 2022-02-02 DIAGNOSIS — N17.0: ICD-10-CM
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
## 2022-02-02 DIAGNOSIS — Z82.49: ICD-10-CM

## 2022-02-02 DIAGNOSIS — F41.1: ICD-10-CM

## 2022-02-02 DIAGNOSIS — Z90.710: ICD-10-CM

## 2022-02-02 DIAGNOSIS — G89.29: ICD-10-CM
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
## 2022-02-02 DIAGNOSIS — Z83.3: ICD-10-CM

## 2022-02-02 DIAGNOSIS — Z20.822: ICD-10-CM
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
## 2022-02-02 LAB
ABSOLUTE BASOPHILS: 0 THOU/UL (ref 0–0.2)
ABSOLUTE EOSINOPHILS: 0.1 THOU/UL (ref 0–0.7)
ABSOLUTE MONOCYTES: 0.5 THOU/UL (ref 0–1.2)
ALBUMIN SERPL-MCNC: 3.7 G/DL (ref 3.4–5)
ALP SERPL-CCNC: 52 U/L (ref 46–116)
ALT SERPL-CCNC: 19 U/L (ref 30–65)
ANION GAP SERPL CALC-SCNC: 8 MMOL/L (ref 7–16)
APTT BLD: 33.9 SECONDS (ref 25–31.3)
AST SERPL-CCNC: 18 U/L (ref 15–37)
BASOPHILS NFR BLD AUTO: 0.8 %
BILIRUB SERPL-MCNC: 0.5 MG/DL
BILIRUB UR-MCNC: NEGATIVE MG/DL
BUN SERPL-MCNC: 22 MG/DL (ref 7–18)
CALCIUM SERPL-MCNC: 8.7 MG/DL (ref 8.5–10.1)
CHLORIDE SERPL-SCNC: 90 MMOL/L (ref 98–107)
CO2 SERPL-SCNC: 22 MMOL/L (ref 21–32)
COLOR UR: YELLOW
CREAT SERPL-MCNC: 1.5 MG/DL (ref 0.6–1.3)
EOSINOPHIL NFR BLD: 3.8 %
GLUCOSE SERPL-MCNC: 109 MG/DL (ref 70–99)
GRANULOCYTES NFR BLD MANUAL: 65 %
HCT VFR BLD CALC: 29.3 % (ref 37–47)
HGB BLD-MCNC: 9.8 GM/DL (ref 12–15)
INR PPP: 1.2
KETONES UR STRIP-MCNC: NEGATIVE MG/DL
LYMPHOCYTES # BLD: 0.6 THOU/UL (ref 0.8–5.3)
LYMPHOCYTES NFR BLD AUTO: 16.6 %
MCH RBC QN AUTO: 29.8 PG (ref 26–34)
MCHC RBC AUTO-ENTMCNC: 33.4 G/DL (ref 28–37)
MCV RBC: 89.2 FL (ref 80–100)
MONOCYTES NFR BLD: 13.8 %
MPV: 8.1 FL. (ref 7.2–11.1)
NEUTROPHILS # BLD: 2.5 THOU/UL (ref 1.6–8.1)
NUCLEATED RBCS: 0 /100WBC
PLATELET COUNT*: 179 THOU/UL (ref 150–400)
POTASSIUM SERPL-SCNC: 4.7 MMOL/L (ref 3.5–5.1)
PROT SERPL-MCNC: 6.5 G/DL (ref 6.4–8.2)
PROT UR QL STRIP: NEGATIVE
PROTHROMBIN TIME: 12 SECONDS (ref 9.2–11.5)
RBC # BLD AUTO: 3.29 MIL/UL (ref 4.2–5)
RBC # UR STRIP: NEGATIVE /UL
RDW-CV: 15.5 % (ref 10.5–14.5)
SODIUM SERPL-SCNC: 120 MMOL/L (ref 136–145)
SP GR UR STRIP: 1.01 (ref 1–1.03)
URINE CLARITY: CLEAR
URINE GLUCOSE-RANDOM: NEGATIVE
URINE LEUKOCYTES-REFLEX: NEGATIVE
URINE NITRITE-REFLEX: NEGATIVE
UROBILINOGEN UR STRIP-ACNC: 0.2 E.U./DL (ref 0.2–1)
WBC # BLD AUTO: 3.8 THOU/UL (ref 4–11)

## 2022-02-03 VITALS — SYSTOLIC BLOOD PRESSURE: 144 MMHG | DIASTOLIC BLOOD PRESSURE: 60 MMHG

## 2022-02-03 VITALS — DIASTOLIC BLOOD PRESSURE: 64 MMHG | SYSTOLIC BLOOD PRESSURE: 130 MMHG

## 2022-02-03 VITALS — SYSTOLIC BLOOD PRESSURE: 116 MMHG | DIASTOLIC BLOOD PRESSURE: 54 MMHG

## 2022-02-03 VITALS — DIASTOLIC BLOOD PRESSURE: 62 MMHG | SYSTOLIC BLOOD PRESSURE: 119 MMHG

## 2022-02-03 VITALS — SYSTOLIC BLOOD PRESSURE: 127 MMHG | DIASTOLIC BLOOD PRESSURE: 63 MMHG

## 2022-02-03 VITALS — DIASTOLIC BLOOD PRESSURE: 77 MMHG | SYSTOLIC BLOOD PRESSURE: 155 MMHG

## 2022-02-03 LAB
ABSOLUTE BASOPHILS: 0 THOU/UL (ref 0–0.2)
ABSOLUTE EOSINOPHILS: 0.1 THOU/UL (ref 0–0.7)
ABSOLUTE MONOCYTES: 0.7 THOU/UL (ref 0–1.2)
ANION GAP SERPL CALC-SCNC: 9 MMOL/L (ref 7–16)
BASOPHILS NFR BLD AUTO: 0.4 %
BUN SERPL-MCNC: 19 MG/DL (ref 7–18)
CALCIUM SERPL-MCNC: 8.7 MG/DL (ref 8.5–10.1)
CHLORIDE SERPL-SCNC: 94 MMOL/L (ref 98–107)
CO2 SERPL-SCNC: 23 MMOL/L (ref 21–32)
CREAT SERPL-MCNC: 1.4 MG/DL (ref 0.6–1.3)
EOSINOPHIL NFR BLD: 1.6 %
GLUCOSE SERPL-MCNC: 115 MG/DL (ref 70–99)
GRANULOCYTES NFR BLD MANUAL: 75.7 %
HCT VFR BLD CALC: 28.3 % (ref 37–47)
HGB BLD-MCNC: 9.2 GM/DL (ref 12–15)
LYMPHOCYTES # BLD: 0.6 THOU/UL (ref 0.8–5.3)
LYMPHOCYTES NFR BLD AUTO: 9.8 %
MAGNESIUM SERPL-MCNC: 1.7 MG/DL (ref 1.8–2.4)
MCH RBC QN AUTO: 29.7 PG (ref 26–34)
MCHC RBC AUTO-ENTMCNC: 32.5 G/DL (ref 28–37)
MCV RBC: 91.4 FL (ref 80–100)
MONOCYTES NFR BLD: 12.5 %
MPV: 8.2 FL. (ref 7.2–11.1)
NEUTROPHILS # BLD: 4.3 THOU/UL (ref 1.6–8.1)
NUCLEATED RBCS: 0 /100WBC
PHOSPHATE SERPL-MCNC: 2.7 MG/DL (ref 2.5–4.9)
PLATELET COUNT*: 175 THOU/UL (ref 150–400)
POTASSIUM SERPL-SCNC: 4 MMOL/L (ref 3.5–5.1)
RBC # BLD AUTO: 3.09 MIL/UL (ref 4.2–5)
RDW-CV: 16.4 % (ref 10.5–14.5)
SODIUM SERPL-SCNC: 126 MMOL/L (ref 136–145)
WBC # BLD AUTO: 5.7 THOU/UL (ref 4–11)

## 2022-02-03 NOTE — EKG
[place] [address]
Phone:  [phone]                     ELECTROCARDIOGRAM REPORT      
_______________________________________________________________________________
 
Name:         ALPERS,BERNICE KAY            Room:          [address]    ADM [place] 
M.R.#:    [id number]     Account #:     [id number]  
Admission:    22    Attend Phys:   González Trammell
Discharge:                Date of Birth: [birth year]  
Date of Service: 22 1540  Report #:      [id number]
        [id number]
_______________________________________________________________________________
THIS REPORT FOR:  //name//                      
 
                         [facility] ED
                                       
Test Date:    2022               Test Time:    15:40:38
Pat Name:     BERNICE ALPERS           Department:   
Patient ID:   [id number]            Room:         [facility]
Gender:       F                        Technician:   NARINDER
:          1950               Requested By: Anna Navarro
Order Number: [id number] MD:   Edward Woods
                                 Measurements
Intervals                              Axis          
Rate:         84                       P:            68
WA:           194                      QRS:          141
QRSD:         168                      T:            -23
QT:           429                                    
QTc:          508                                    
                           Interpretive Statements
Sinus rhythm
Left bundle branch block
compared to ECG 11/10/2021 10:00:20
No significant changes noted
Electronically Signed On 2-3-2022 9:14:42 CST by Edward Woods
[email or web address]
 
 
 
 
 
 
 
 
 
 
 
 
 
 
 
 
 
 
 
 
  <ELECTRONICALLY SIGNED>
                                           By: Edward Woods MD, FACC   
  22     0914
D: 22 1540   _____________________________________
T: 22 1540   Edward Woods MD, [facility]     /EPI

## 2022-02-04 VITALS — DIASTOLIC BLOOD PRESSURE: 34 MMHG | SYSTOLIC BLOOD PRESSURE: 87 MMHG

## 2022-02-04 VITALS — SYSTOLIC BLOOD PRESSURE: 121 MMHG | DIASTOLIC BLOOD PRESSURE: 50 MMHG

## 2022-02-04 VITALS — DIASTOLIC BLOOD PRESSURE: 42 MMHG | SYSTOLIC BLOOD PRESSURE: 104 MMHG

## 2022-02-04 VITALS — SYSTOLIC BLOOD PRESSURE: 102 MMHG | DIASTOLIC BLOOD PRESSURE: 46 MMHG

## 2022-02-04 VITALS — DIASTOLIC BLOOD PRESSURE: 64 MMHG | SYSTOLIC BLOOD PRESSURE: 141 MMHG

## 2022-02-04 VITALS — SYSTOLIC BLOOD PRESSURE: 113 MMHG | DIASTOLIC BLOOD PRESSURE: 50 MMHG

## 2022-02-04 VITALS — DIASTOLIC BLOOD PRESSURE: 57 MMHG | SYSTOLIC BLOOD PRESSURE: 113 MMHG

## 2022-02-04 LAB
ANION GAP SERPL CALC-SCNC: 9 MMOL/L (ref 7–16)
BUN SERPL-MCNC: 20 MG/DL (ref 7–18)
CALCIUM SERPL-MCNC: 8.7 MG/DL (ref 8.5–10.1)
CHLORIDE SERPL-SCNC: 100 MMOL/L (ref 98–107)
CO2 SERPL-SCNC: 23 MMOL/L (ref 21–32)
CREAT SERPL-MCNC: 1.4 MG/DL (ref 0.6–1.3)
GLUCOSE SERPL-MCNC: 102 MG/DL (ref 70–99)
POTASSIUM SERPL-SCNC: 4 MMOL/L (ref 3.5–5.1)
SODIUM SERPL-SCNC: 132 MMOL/L (ref 136–145)

## 2022-02-05 VITALS — SYSTOLIC BLOOD PRESSURE: 127 MMHG | DIASTOLIC BLOOD PRESSURE: 42 MMHG

## 2022-02-05 VITALS — DIASTOLIC BLOOD PRESSURE: 42 MMHG | SYSTOLIC BLOOD PRESSURE: 124 MMHG

## 2022-02-05 VITALS — DIASTOLIC BLOOD PRESSURE: 54 MMHG | SYSTOLIC BLOOD PRESSURE: 119 MMHG

## 2022-02-05 VITALS — DIASTOLIC BLOOD PRESSURE: 43 MMHG | SYSTOLIC BLOOD PRESSURE: 105 MMHG

## 2022-02-05 VITALS — SYSTOLIC BLOOD PRESSURE: 93 MMHG | DIASTOLIC BLOOD PRESSURE: 33 MMHG

## 2022-02-05 VITALS — DIASTOLIC BLOOD PRESSURE: 47 MMHG | SYSTOLIC BLOOD PRESSURE: 106 MMHG

## 2022-02-05 LAB
ANION GAP SERPL CALC-SCNC: 7 MMOL/L (ref 7–16)
BUN SERPL-MCNC: 20 MG/DL (ref 7–18)
CALCIUM SERPL-MCNC: 8.2 MG/DL (ref 8.5–10.1)
CHLORIDE SERPL-SCNC: 105 MMOL/L (ref 98–107)
CO2 SERPL-SCNC: 24 MMOL/L (ref 21–32)
CREAT SERPL-MCNC: 1.5 MG/DL (ref 0.6–1.3)
GLUCOSE SERPL-MCNC: 118 MG/DL (ref 70–99)
MAGNESIUM SERPL-MCNC: 2.2 MG/DL (ref 1.8–2.4)
POTASSIUM SERPL-SCNC: 4.1 MMOL/L (ref 3.5–5.1)
SODIUM SERPL-SCNC: 136 MMOL/L (ref 136–145)

## 2022-02-06 VITALS — SYSTOLIC BLOOD PRESSURE: 138 MMHG | DIASTOLIC BLOOD PRESSURE: 62 MMHG

## 2022-02-06 VITALS — DIASTOLIC BLOOD PRESSURE: 50 MMHG | SYSTOLIC BLOOD PRESSURE: 109 MMHG

## 2022-02-06 VITALS — DIASTOLIC BLOOD PRESSURE: 68 MMHG | SYSTOLIC BLOOD PRESSURE: 150 MMHG

## 2022-02-06 VITALS — SYSTOLIC BLOOD PRESSURE: 92 MMHG | DIASTOLIC BLOOD PRESSURE: 44 MMHG

## 2022-02-06 VITALS — SYSTOLIC BLOOD PRESSURE: 116 MMHG | DIASTOLIC BLOOD PRESSURE: 50 MMHG

## 2022-02-06 VITALS — DIASTOLIC BLOOD PRESSURE: 59 MMHG | SYSTOLIC BLOOD PRESSURE: 134 MMHG

## 2022-02-07 VITALS — DIASTOLIC BLOOD PRESSURE: 55 MMHG | SYSTOLIC BLOOD PRESSURE: 125 MMHG

## 2022-02-07 VITALS — SYSTOLIC BLOOD PRESSURE: 125 MMHG | DIASTOLIC BLOOD PRESSURE: 63 MMHG

## 2022-02-07 VITALS — SYSTOLIC BLOOD PRESSURE: 119 MMHG | DIASTOLIC BLOOD PRESSURE: 60 MMHG

## 2022-02-07 VITALS — SYSTOLIC BLOOD PRESSURE: 150 MMHG | DIASTOLIC BLOOD PRESSURE: 68 MMHG

## 2022-02-07 VITALS — DIASTOLIC BLOOD PRESSURE: 62 MMHG | SYSTOLIC BLOOD PRESSURE: 126 MMHG

## 2022-02-08 VITALS — SYSTOLIC BLOOD PRESSURE: 108 MMHG | DIASTOLIC BLOOD PRESSURE: 60 MMHG

## 2022-02-08 VITALS — DIASTOLIC BLOOD PRESSURE: 48 MMHG | SYSTOLIC BLOOD PRESSURE: 121 MMHG

## 2022-02-08 VITALS — SYSTOLIC BLOOD PRESSURE: 134 MMHG | DIASTOLIC BLOOD PRESSURE: 54 MMHG

## 2022-02-08 VITALS — DIASTOLIC BLOOD PRESSURE: 60 MMHG | SYSTOLIC BLOOD PRESSURE: 146 MMHG

## 2022-02-08 VITALS — DIASTOLIC BLOOD PRESSURE: 46 MMHG | SYSTOLIC BLOOD PRESSURE: 104 MMHG

## 2022-02-08 LAB
ANION GAP SERPL CALC-SCNC: 11 MMOL/L (ref 7–16)
BUN SERPL-MCNC: 36 MG/DL (ref 7–18)
CALCIUM SERPL-MCNC: 9.1 MG/DL (ref 8.5–10.1)
CHLORIDE SERPL-SCNC: 103 MMOL/L (ref 98–107)
CO2 SERPL-SCNC: 25 MMOL/L (ref 21–32)
CREAT SERPL-MCNC: 1.8 MG/DL (ref 0.6–1.3)
GLUCOSE SERPL-MCNC: 111 MG/DL (ref 70–99)
POTASSIUM SERPL-SCNC: 4.5 MMOL/L (ref 3.5–5.1)
SODIUM SERPL-SCNC: 139 MMOL/L (ref 136–145)

## 2022-02-09 VITALS — SYSTOLIC BLOOD PRESSURE: 117 MMHG | DIASTOLIC BLOOD PRESSURE: 58 MMHG

## 2022-02-09 VITALS — DIASTOLIC BLOOD PRESSURE: 79 MMHG | SYSTOLIC BLOOD PRESSURE: 142 MMHG

## 2022-02-09 VITALS — SYSTOLIC BLOOD PRESSURE: 117 MMHG | DIASTOLIC BLOOD PRESSURE: 62 MMHG

## 2022-02-09 VITALS — SYSTOLIC BLOOD PRESSURE: 124 MMHG | DIASTOLIC BLOOD PRESSURE: 57 MMHG

## 2022-02-09 LAB
ANION GAP SERPL CALC-SCNC: 9 MMOL/L (ref 7–16)
BUN SERPL-MCNC: 39 MG/DL (ref 7–18)
CALCIUM SERPL-MCNC: 9 MG/DL (ref 8.5–10.1)
CHLORIDE SERPL-SCNC: 106 MMOL/L (ref 98–107)
CO2 SERPL-SCNC: 26 MMOL/L (ref 21–32)
CREAT SERPL-MCNC: 1.8 MG/DL (ref 0.6–1.3)
GLUCOSE SERPL-MCNC: 105 MG/DL (ref 70–99)
POTASSIUM SERPL-SCNC: 4.3 MMOL/L (ref 3.5–5.1)
SODIUM SERPL-SCNC: 141 MMOL/L (ref 136–145)

## 2022-02-24 NOTE — EKG
[place] [address]
Phone:  [phone]                     ELECTROCARDIOGRAM REPORT      
_______________________________________________________________________________
 
Name:         ALPERS,BERNICE KAY            Room:          [place] [address]    ADM [place] 
.R.#:    [id number]     Account #:     [id number]  
Admission:    21    Attend Phys:   Gaston Macdonald, 
Discharge:                Date of Birth: [birth year]  
Date of Service: 11/10/21 1000  Report #:      [id number]
        [id number]
_______________________________________________________________________________
THIS REPORT FOR:  //name//                      
 
                          [facility]
                                       
Test Date:    2021-11-10               Test Time:    10:00:20
Pat Name:     BERNICE ALPERS           Department:   
Patient ID:   [id number]            Room:         [place] [address]
Gender:       F                        Technician:   TETO
:          1950               Requested By: Billy Connelly
Order Number: [id number]    Gretel MD:   Billy Connelly
                                 Measurements
Intervals                              Axis          
Rate:         90                       P:            67
IN:           204                      QRS:          -38
QRSD:         168                      T:            143
QT:           424                                    
QTc:          519                                    
                           Interpretive Statements
Sinus rhythm
Left bundle branch block
Compared to ECG 2021 12:56:25
No significant changes
Electronically Signed On 11- 14:50:40 CST by Billy Connelly
[email or web address]
 
 
 
 
 
 
 
 
 
 
 
 
 
 
 
 
 
 
 
 
  <ELECTRONICALLY SIGNED>
                                           By: Billy Connelly MD, FAC      
  11/10/21     1450
D: 11/10/21 1000   _____________________________________
T: 11/10/21 1000   Billy Connelly MD, [facility]        /EPI with rolling walker/fair balance